# Patient Record
Sex: FEMALE | Race: BLACK OR AFRICAN AMERICAN | NOT HISPANIC OR LATINO | Employment: OTHER | URBAN - METROPOLITAN AREA
[De-identification: names, ages, dates, MRNs, and addresses within clinical notes are randomized per-mention and may not be internally consistent; named-entity substitution may affect disease eponyms.]

---

## 2017-01-25 ENCOUNTER — OFFICE VISIT (OUTPATIENT)
Dept: OPHTHALMOLOGY | Facility: CLINIC | Age: 62
End: 2017-01-25
Attending: OPHTHALMOLOGY
Payer: COMMERCIAL

## 2017-01-25 DIAGNOSIS — H53.10 SUBJECTIVE VISUAL DISTURBANCE: ICD-10-CM

## 2017-01-25 PROCEDURE — 99213 OFFICE O/P EST LOW 20 MIN: CPT | Mod: 25,ZF

## 2017-01-25 PROCEDURE — 92134 CPTRZ OPH DX IMG PST SGM RTA: CPT | Mod: ZF | Performed by: OPHTHALMOLOGY

## 2017-01-25 ASSESSMENT — REFRACTION_WEARINGRX
OD_SPHERE: -4.50
OS_AXIS: 004
SPECS_TYPE: PAL
OS_SPHERE: -4.00
OS_CYLINDER: +1.00
OD_AXIS: 048
OD_CYLINDER: +1.25

## 2017-01-25 ASSESSMENT — EXTERNAL EXAM - RIGHT EYE: OD_EXAM: NORMAL

## 2017-01-25 ASSESSMENT — VISUAL ACUITY
OS_CC: 20/40
OD_PH_CC: 20/50+2
OD_CC: 20/50
METHOD: SNELLEN - LINEAR
CORRECTION_TYPE: GLASSES
OD_CC+: -2
OS_CC+: -1

## 2017-01-25 ASSESSMENT — SLIT LAMP EXAM - LIDS
COMMENTS: NORMAL
COMMENTS: NORMAL

## 2017-01-25 ASSESSMENT — EXTERNAL EXAM - LEFT EYE: OS_EXAM: NORMAL

## 2017-01-25 ASSESSMENT — TONOMETRY
IOP_METHOD: TONOPEN
OS_IOP_MMHG: 13
OD_IOP_MMHG: 13

## 2017-01-25 ASSESSMENT — CONF VISUAL FIELD
OD_NORMAL: 1
METHOD: COUNTING FINGERS
OS_NORMAL: 1

## 2017-01-25 NOTE — NURSING NOTE
Chief Complaints and History of Present Illnesses   Patient presents with     Follow Up For     1 year follow up for previously seen visual disturbance in both eyes where they could not be corrected to 20/20.     HPI    Affected eye(s):  Both   Symptoms:     (Comment: Vision is unchanged BE.)   No floaters   No flashes   No redness   No tearing   No itching         Do you have eye pain now?:  No      Comments:  1 year follow up for previously seen visual disturbance in both eyes where they could not be corrected to 20/20.    Chang MOREIRA  8:50 AM01/25/2017

## 2017-01-25 NOTE — PROGRESS NOTES
I have confirmed the patient's and reviewed Past Medical History, Past Surgical History, Social History, Family History, Problem List, Medication List and agree with Tech note.        CHIEF COMPLAINT:  Patient is a 61 year old female who presents to the Retina Service for follow up for blurry vision both eyes     HPI:    Previously seen visual disturbance in both eyes where they could not be corrected to 20/20    Here for results mfERG done today    PAST OPHTHALMIC/MEDICAL HISTORY:    Visual disturbance both eyes     Trace nuclear sclerotic cataract  Both eyes and cortical changes right eye     PAST OPHTHALMIC PROCEDURE/SURGICAL HISTORY:    None    CURRENT OPHTHALMIC MEDICATIONS:    None    RETINAL IMAGING:    OCT macula both eyes on 9/18/14 and today showed IS/OS junction abnormalities and is stable today    Fundus autofluorescence both eyes was normal on 9/18/14, repeat in one year     Fundus exam previously showed speckled pattern noted in inferior rectus reflectance image both eyes     Fundus photo showed Retinal pigment epithelium mottling    mfERG last year shows decreased response centrally in both eyes      ASSESSMENT / PLAN:        1. Visual disturbance both eyes, cannot correct vision to 20/20 both eyes    - mfERG shows decreased central visual sensitivity in both eyes     2.  Myopia both eyes    - patient stated today she was not diagnosed until age 24.  Another option is that she suffers from intercurrent bilateral amblyopia.    3.  Nuclear sclerotic cataract  Both eyes    - Not visually significant, right eye > left eye    - refract as needed    - new prescription glasses issued    - monitor yearly       Poli Addison MD PhD.  Professor & Chair

## 2017-01-25 NOTE — MR AVS SNAPSHOT
After Visit Summary   1/25/2017    Jaylyn Berger    MRN: [id number]           Patient Information     Date Of Birth          1955        Visit Information        Provider Department      1/25/2017 9:00 AM Poli Quinteros MD [facility]        Today's Diagnoses     Subjective visual disturbance           Care Instructions    Future Appointments  Date Time Provider Department Center   1/31/2018 9:00 AM Poli Quinteros MD [facility]             Follow-ups after your visit        Follow-up notes from your care team     Return in about 1 year (around 1/25/2018) for visual disturbance , Exam & OCT OU, FAF OU.      Future tests that were ordered for you today     Open Future Orders        Priority Expected Expires Ordered    OCT Retina Spectralis OU (both eyes) Routine  7/29/2018 1/25/2017    Fundus Autofluorescence Image (FAF) OU (both eyes) Routine  7/29/2018 1/25/2017            Who to contact     Please call your clinic at [phone] to:    Ask questions about your health    Make or cancel appointments    Discuss your medicines    Learn about your test results    Speak to your doctor   If you have compliments or concerns about an experience at your clinic, or if you wish to file a complaint, please contact [facility] Patient Relations at [phone] or email us at [email or web address]         Additional Information About Your Visit        MyChart Information     Escomt is an electronic gateway that provides easy, online access to your medical records. With [employer], you can request a clinic appointment, read your test results, renew a prescription or communicate with your care team.     To sign up for Escomt visit the website at [email or web address]   You will be asked to enter the access code listed below, as well as some personal information. Please follow the directions to create your username and  password.     Your access code is: [email or web address]  Expires: 2017  8:30 AM     Your access code will  in 90 days. If you need help or a new code, please contact your [facility] or call [phone] for assistance.        Care EveryWhere ID     This is your Care EveryWhere ID. This could be used by other organizations to access your [place] medical records  [id number]         Blood Pressure from Last 3 Encounters:   No data found for BP    Weight from Last 3 Encounters:   No data found for Wt              We Performed the Following     OCT Retina Spectralis OU (both eyes)        Primary Care Provider    No Pcp Confirmed       No address on file        Thank you!     Thank you for choosing EYE CLINIC  for your care. Our goal is always to provide you with excellent care. Hearing back from our patients is one way we can continue to improve our services. Please take a few minutes to complete the written survey that you may receive in the mail after your visit with us. Thank you!             Your Updated Medication List - Protect others around you: Learn how to safely use, store and throw away your medicines at www.disposemymeds.org.          This list is accurate as of: 17 10:05 AM.  Always use your most recent med list.                   Brand Name Dispense Instructions for use    ASPIRIN PO      Take 1 tablet by mouth daily       ATENOLOL-CHLORTHALIDONE PO      Take 1 tablet by mouth daily       GLIPIZIDE PO      Take 2 tablets by mouth daily       JANUVIA PO      Take by mouth daily       LOSARTAN POTASSIUM PO      Take 1 tablet by mouth daily       METFORMIN HCL PO      Take 1 tablet by mouth daily       MULTI-VITAMIN PO      Take 1 tablet by mouth daily

## 2017-01-25 NOTE — PATIENT INSTRUCTIONS
Future Appointments  Date Time Provider Department Center   1/31/2018 9:00 AM Poli Quinteros MD [facility] CL[facility]

## 2018-01-31 ENCOUNTER — OFFICE VISIT (OUTPATIENT)
Dept: OPHTHALMOLOGY | Facility: CLINIC | Age: 63
End: 2018-01-31
Attending: OPHTHALMOLOGY
Payer: COMMERCIAL

## 2018-01-31 DIAGNOSIS — H53.10 SUBJECTIVE VISUAL DISTURBANCE: ICD-10-CM

## 2018-01-31 PROCEDURE — 92250 FUNDUS PHOTOGRAPHY W/I&R: CPT | Mod: 59,ZF | Performed by: OPHTHALMOLOGY

## 2018-01-31 PROCEDURE — 92134 CPTRZ OPH DX IMG PST SGM RTA: CPT | Mod: ZF | Performed by: OPHTHALMOLOGY

## 2018-01-31 PROCEDURE — G0463 HOSPITAL OUTPT CLINIC VISIT: HCPCS | Mod: ZF

## 2018-01-31 ASSESSMENT — VISUAL ACUITY
CORRECTION_TYPE: GLASSES
OS_CC: 20/40
METHOD: SNELLEN - LINEAR
OD_CC+: +2
OD_CC: 20/60
OD_PH_CC: 20/50

## 2018-01-31 ASSESSMENT — REFRACTION_WEARINGRX
OS_CYLINDER: +1.00
OS_AXIS: 004
OD_AXIS: 048
OD_SPHERE: -4.50
OS_SPHERE: -4.00
OD_CYLINDER: +1.25
SPECS_TYPE: PAL

## 2018-01-31 ASSESSMENT — CONF VISUAL FIELD
OS_NORMAL: 1
OD_NORMAL: 1

## 2018-01-31 ASSESSMENT — EXTERNAL EXAM - LEFT EYE: OS_EXAM: NORMAL

## 2018-01-31 ASSESSMENT — SLIT LAMP EXAM - LIDS
COMMENTS: NORMAL
COMMENTS: NORMAL

## 2018-01-31 ASSESSMENT — EXTERNAL EXAM - RIGHT EYE: OD_EXAM: NORMAL

## 2018-01-31 ASSESSMENT — TONOMETRY
OS_IOP_MMHG: 17
OD_IOP_MMHG: 17
IOP_METHOD: TONOPEN

## 2018-01-31 NOTE — NURSING NOTE
Chief Complaints and History of Present Illnesses   Patient presents with     Follow Up For      visual disturbance , Exam & OCT OU, FAF OU     HPI    Affected eye(s):  Both   Symptoms:     No floaters   No flashes   No glare   No halos      Duration:  1 year      Do you have eye pain now?:  No      Comments:   Follow up forvisual disturbance , Exam & OCT OU, FAF OU  No change in vision since last visit  Blood sugar don't check daily, A1C 7.in Mohsen Enriquez COA 8:55 AM January 31, 2018

## 2018-01-31 NOTE — MR AVS SNAPSHOT
After Visit Summary   2018    Jaylyn Berger    MRN: [id number]           Patient Information     Date Of Birth          1955        Visit Information        Provider Department      2018 9:00 AM Poli Quinteros MD [facility]        Today's Diagnoses     Subjective visual disturbance           Follow-ups after your visit        Follow-up notes from your care team     Return in about 6 months (around 2018) for  OU  Exam only.      Your next 10 appointments already scheduled     Aug 01, 2018  9:00 AM CDT   RETURN RETINA with Poli Quinteros MD   Eye Clinic ([facility])    [address]  [address]  [address]  [facility] [address] [phone]              Who to contact     Please call your clinic at [phone] to:    Ask questions about your health    Make or cancel appointments    Discuss your medicines    Learn about your test results    Speak to your doctor   If you have compliments or concerns about an experience at your clinic, or if you wish to file a complaint, please contact [facility] Patient Relations at [phone] or email us at [email or web address]         Additional Information About Your Visit        MyChart Information     AFreezet is an electronic gateway that provides easy, online access to your medical records. With [employer], you can request a clinic appointment, read your test results, renew a prescription or communicate with your care team.     To sign up for AFreezet visit the website at [email or web address]   You will be asked to enter the access code listed below, as well as some personal information. Please follow the directions to create your username and password.     Your access code is: [email or web address]  Expires: 2018  6:30 AM     Your access code will  in 90 days. If you need help or a new code, please contact your [facility]  [place] [facility] or call [phone] for assistance.        Care EveryWhere ID     This is your Care EveryWhere ID. This could be used by other organizations to access your [place] medical records  [id number]         Blood Pressure from Last 3 Encounters:   No data found for BP    Weight from Last 3 Encounters:   No data found for Wt              We Performed the Following     Fundus Autofluorescence Image (FAF) OU (both eyes)     OCT Retina Spectralis OU (both eyes)        Primary Care Provider Office Phone # Fax #    Bethany Mckinnon [phone]       [facility] [address] [place] [address]        Equal Access to Services     [facility]: Hadii aad ku hadasho Soomaali, waaxda luqadaha, qaybta kaalmada adeegyada, waxay idiin hayaan adeelpidio matthews laalyssa . So [facility] [place] [phone].    ATENCIÓN: Si habla español, tiene a muniz disposición servicios gratuitos de asistencia lingüística. [facility] [phone].    We comply with applicable federal civil rights laws and [place] laws. We do not discriminate on the basis of race, color, national origin, age, disability, sex, sexual orientation, or gender identity.            Thank you!     Thank you for choosing EYE CLINIC  for your care. Our goal is always to provide you with excellent care. Hearing back from our patients is one way we can continue to improve our services. Please take a few minutes to complete the written survey that you may receive in the mail after your visit with us. Thank you!             Your Updated Medication List - Protect others around you: Learn how to safely use, store and throw away your medicines at www.disposemymeds.org.          This list is accurate as of 1/31/18 10:47 AM.  Always use your most recent med list.                   Brand Name Dispense Instructions for use Diagnosis    ASPIRIN PO      Take 1 tablet by mouth daily        ATENOLOL-CHLORTHALIDONE PO      Take 1 tablet by  mouth daily        GLIPIZIDE PO      Take 2 tablets by mouth daily        JANUVIA PO      Take by mouth daily        LOSARTAN POTASSIUM PO      Take 1 tablet by mouth daily        METFORMIN HCL PO      Take 1 tablet by mouth daily        MULTI-VITAMIN PO      Take 1 tablet by mouth daily

## 2018-01-31 NOTE — PROGRESS NOTES
I have confirmed the patient's and reviewed Past Medical History, Past Surgical History, Social History, Family History, Problem List, Medication List and agree with Tech note.        CHIEF COMPLAINT:  Patient is a 62 year old female who presents to the Retina Service for follow up for blurry vision both eyes     HPI:    Previously seen visual disturbance in both eyes where they could not be corrected to 20/20    Here for results mfERG done today    PAST OPHTHALMIC/MEDICAL HISTORY:    Visual disturbance both eyes     Trace nuclear sclerotic cataract  Both eyes and cortical changes right eye     PAST OPHTHALMIC PROCEDURE/SURGICAL HISTORY:    None    CURRENT OPHTHALMIC MEDICATIONS:    None    RETINAL IMAGING:    OCT macula both eyes on 9/18/14 and today showed IS/OS junction abnormalities and is stable today    Fundus autofluorescence both eyes was normal on 9/18/14, and again today    Fundus exam previously showed speckled pattern noted in inferior rectus reflectance image both eyes     Fundus photo showed Retinal pigment epithelium mottling    mfERG last year shows decreased response centrally in both eyes      ASSESSMENT / PLAN:        1. Visual disturbance both eyes, cannot correct vision to 20/20 both eyes    - mfERG shows decreased central visual sensitivity in both eyes and visual acuity stable except right eye which I contribute to nuclear sclerotic cataract .     2.  Myopia both eyes    - patient stated today she was not diagnosed until age 24.  Another option is that she suffers from intercurrent bilateral amblyopia.    3.  Nuclear sclerotic cataract  Both eyes    - Early visually significant, right eye, stable  left eye.  Suggested to patient to consider Cataract extraction right eye, she will think about it.   - refract as needed    - new prescription glasses issued    - monitor yearly     Return to clinic 9 months     Poli Addison MD PhD.  Professor & Chair

## 2018-07-10 ENCOUNTER — TELEPHONE (OUTPATIENT)
Dept: OPHTHALMOLOGY | Facility: CLINIC | Age: 63
End: 2018-07-10

## 2018-07-10 NOTE — TELEPHONE ENCOUNTER
[facility] Call Center    Phone Message    May a detailed message be left on voicemail: yes    Reason for Call: Other: Pt would like to know what her appt w/ Dr. Addison on 8/2/18 will be for. Pt stated cataract surgery had been discussed at one point, and she is unsure if she was supposed to seek out a provider for this.      Action Taken: Message routed to:  [facility]

## 2018-08-02 ENCOUNTER — OFFICE VISIT (OUTPATIENT)
Dept: OPHTHALMOLOGY | Facility: CLINIC | Age: 63
End: 2018-08-02
Attending: OPHTHALMOLOGY
Payer: COMMERCIAL

## 2018-08-02 DIAGNOSIS — H25.13 SENILE NUCLEAR SCLEROSIS, BILATERAL: Primary | ICD-10-CM

## 2018-08-02 DIAGNOSIS — H53.10 SUBJECTIVE VISUAL DISTURBANCE: ICD-10-CM

## 2018-08-02 PROCEDURE — G0463 HOSPITAL OUTPT CLINIC VISIT: HCPCS | Mod: ZF

## 2018-08-02 RX ORDER — LINAGLIPTIN 5 MG/1
1 TABLET, FILM COATED ORAL ONCE
Refills: 2 | COMMUNITY
Start: 2018-07-09 | End: 2024-04-18

## 2018-08-02 ASSESSMENT — EXTERNAL EXAM - RIGHT EYE: OD_EXAM: NORMAL

## 2018-08-02 ASSESSMENT — REFRACTION_WEARINGRX
OS_SPHERE: -4.00
OD_AXIS: 048
OS_CYLINDER: +1.00
SPECS_TYPE: PAL
OD_SPHERE: -4.50
OS_AXIS: 004
OD_CYLINDER: +1.25

## 2018-08-02 ASSESSMENT — VISUAL ACUITY
OS_PH_CC: 20/60-1
OD_CC: 20/60
OD_PH_CC: 20/50-1
CORRECTION_TYPE: GLASSES
OS_CC+: -1
METHOD: SNELLEN - LINEAR
OD_CC+: -1
OS_CC: 20/70

## 2018-08-02 ASSESSMENT — TONOMETRY
IOP_METHOD: TONOPEN
OS_IOP_MMHG: 14
OD_IOP_MMHG: 16

## 2018-08-02 ASSESSMENT — SLIT LAMP EXAM - LIDS
COMMENTS: NORMAL
COMMENTS: NORMAL

## 2018-08-02 ASSESSMENT — CONF VISUAL FIELD
OD_NORMAL: 1
OS_NORMAL: 1

## 2018-08-02 ASSESSMENT — EXTERNAL EXAM - LEFT EYE: OS_EXAM: NORMAL

## 2018-08-02 NOTE — PROGRESS NOTES
I have confirmed the patient's and reviewed Past Medical History, Past Surgical History, Social History, Family History, Problem List, Medication List and agree with Tech note.    Updated HPI:  Increasingly more difficulty seeing per patient      CHIEF COMPLAINT:  Patient is a 63 year old female who presents to the Retina Service for follow up for blurry vision both eyes     HPI:    Previously seen visual disturbance in both eyes where they could not be corrected to 20/20    Here for results mfERG done today    PAST OPHTHALMIC/MEDICAL HISTORY:    Visual disturbance both eyes     Trace nuclear sclerotic cataract  Both eyes and cortical changes right eye     PAST OPHTHALMIC PROCEDURE/SURGICAL HISTORY:    None    CURRENT OPHTHALMIC MEDICATIONS:    None    RETINAL IMAGING:    OCT macula both eyes on 9/18/14 and today showed IS/OS junction abnormalities and is stable today    Fundus autofluorescence both eyes was normal on 9/18/14, and again today    Fundus exam previously showed speckled pattern noted in inferior rectus reflectance image both eyes     Fundus photo showed Retinal pigment epithelium mottling    mfERG last year shows decreased response centrally in both eyes      ASSESSMENT / PLAN:        1. Visual disturbance both eyes, cannot correct vision to 20/20 both eyes    - mfERG shows decreased central visual sensitivity in both eyes and visual acuity stable except right eye which I contribute to nuclear sclerotic cataract .     2.  Myopia both eyes    - patient stated today she was not diagnosed until age 24.  Another option is that she suffers from intercurrent bilateral amblyopia.    3.  Nuclear sclerotic cataract  Both eyes    - Visually significant, right eye, >  left eye.  Suggested to patient to consider Cataract extraction right eye, she is intereted to proceed    - refer to Jose Moreira MD today     Return to clinic one year     Poli Addison MD PhD.  Professor & Chair

## 2018-08-02 NOTE — NURSING NOTE
Chief Complaints and History of Present Illnesses   Patient presents with     Follow Up For     6 month f/u for Visual disturbance both eyes     HPI    Affected eye(s):  Both   Symptoms:           Do you have eye pain now?:  No      Comments:  Pt notes everything is about the same. No changes.     Aliya Sauer@ [facility] 9:28 AM August 2, 2018

## 2018-08-02 NOTE — Clinical Note
Patient is Ocult Macular Dystrophy with nuclear sclerotic cataract  And cortical cataract both eyes.  She would perhaps benefit from Cataract extraction and is interested

## 2018-08-02 NOTE — MR AVS SNAPSHOT
After Visit Summary   2018    Jaylyn Berger    MRN: [id number]           Patient Information     Date Of Birth          1955        Visit Information        Provider Department      2018 9:00 AM Poli Quinteros MD [facility]        Today's Diagnoses     Senile nuclear sclerosis, bilateral    -  1    Subjective visual disturbance           Follow-ups after your visit        Follow-up notes from your care team     Return in about 1 year (around 2019) for  and after cataract ou .      Your next 10 appointments already scheduled     Aug 21, 2018  8:30 AM CDT   LEI GENERAL with Jose Moreira MD   Eye Clinic ([facility])    [address] [place] [id number] [phone]              Who to contact     Please call your clinic at [phone] to:    Ask questions about your health    Make or cancel appointments    Discuss your medicines    Learn about your test results    Speak to your doctor            Additional Information About Your Visit        MyChart Information     TeraView is an electronic gateway that provides easy, online access to your medical records. With TeraView, you can request a clinic appointment, read your test results, renew a prescription or communicate with your care team.     To sign up for TeraView visit the website at [email or web address]   You will be asked to enter the access code listed below, as well as some personal information. Please follow the directions to create your username and password.     Your access code is: [email or web address]  Expires: 10/17/2018  6:31 AM     Your access code will  in 90 days. If you need help or a new code, please contact your [facility] or call [phone] for assistance.        Care EveryWhere ID     This is your Care EveryWhere ID. This could be used by other organizations to access your [place]  medical records  [id number]         Blood Pressure from Last 3 Encounters:   No data found for BP    Weight from Last 3 Encounters:   No data found for Wt              Today, you had the following     No orders found for display       Primary Care Provider Office Phone # Fax #    Bethany Mckinnon [phone]       [facility] [address] [place] [address]        Equal Access to Services     [facility]: Hadii aad ku hadasho Soomaali, waaxda luqadaha, qaybta kaalmada adeegyada, waxay idiin hayaan adeeg kharash la'aan ah. So [facility] [phone].    ATENCIÓN: Si umesh gilbert, tiene a muniz disposición servicios gratuitos de asistencia lingüística. Llame al [phone].    We comply with applicable federal civil rights laws and [place] laws. We do not discriminate on the basis of race, color, national origin, age, disability, sex, sexual orientation, or gender identity.            Thank you!     Thank you for choosing EYE CLINIC  for your care. Our goal is always to provide you with excellent care. Hearing back from our patients is one way we can continue to improve our services. Please take a few minutes to complete the written survey that you may receive in the mail after your visit with us. Thank you!             Your Updated Medication List - Protect others around you: Learn how to safely use, store and throw away your medicines at www.disposemymeds.org.          This list is accurate as of 8/2/18 10:18 AM.  Always use your most recent med list.                   Brand Name Dispense Instructions for use Diagnosis    ASPIRIN PO      Take 1 tablet by mouth daily        ATENOLOL-CHLORTHALIDONE PO      Take 1 tablet by mouth daily        GLIPIZIDE PO      Take 2 tablets by mouth daily        LOSARTAN POTASSIUM PO      Take 1 tablet by mouth daily        METFORMIN HCL PO      Take 1 tablet by mouth daily        MULTI-VITAMIN PO      Take 1 tablet by mouth daily         TRADJENTA 5 MG Tabs tablet   Generic drug:  linagliptin      Take 1 tablet by mouth once

## 2018-08-21 ENCOUNTER — OFFICE VISIT (OUTPATIENT)
Dept: OPHTHALMOLOGY | Facility: CLINIC | Age: 63
End: 2018-08-21
Attending: OPHTHALMOLOGY
Payer: COMMERCIAL

## 2018-08-21 VITALS — HEIGHT: 65 IN | WEIGHT: 130 LBS | BODY MASS INDEX: 21.66 KG/M2

## 2018-08-21 DIAGNOSIS — H25.10 SENILE NUCLEAR SCLEROSIS: Primary | ICD-10-CM

## 2018-08-21 DIAGNOSIS — H25.13 NUCLEAR SENILE CATARACT OF BOTH EYES: ICD-10-CM

## 2018-08-21 DIAGNOSIS — R94.111 ABNORMAL ELECTRORETINOGRAM (ERG): Primary | ICD-10-CM

## 2018-08-21 PROCEDURE — G0463 HOSPITAL OUTPT CLINIC VISIT: HCPCS | Mod: ZF

## 2018-08-21 PROCEDURE — 76519 ECHO EXAM OF EYE: CPT | Mod: ZF | Performed by: OPHTHALMOLOGY

## 2018-08-21 PROCEDURE — 92015 DETERMINE REFRACTIVE STATE: CPT | Mod: ZF

## 2018-08-21 ASSESSMENT — SLIT LAMP EXAM - LIDS
COMMENTS: NORMAL
COMMENTS: NORMAL

## 2018-08-21 ASSESSMENT — REFRACTION_WEARINGRX
OS_AXIS: 176
OD_ADD: +2.25
OS_ADD: +2.25
OD_CYLINDER: +0.75
OD_SPHERE: -4.50
OS_SPHERE: -4.00
OD_AXIS: 026
OS_CYLINDER: +0.75

## 2018-08-21 ASSESSMENT — EXTERNAL EXAM - RIGHT EYE: OD_EXAM: NORMAL

## 2018-08-21 ASSESSMENT — CONF VISUAL FIELD
OS_NORMAL: 1
METHOD: COUNTING FINGERS
OD_NORMAL: 1

## 2018-08-21 ASSESSMENT — REFRACTION_MANIFEST
OS_SPHERE: -4.50
OS_AXIS: 170
OD_AXIS: 035
OD_SPHERE: -4.75
OS_CYLINDER: +1.50
OD_CYLINDER: +1.75

## 2018-08-21 ASSESSMENT — VISUAL ACUITY
OD_CC: 20/40-2
OS_CC: 20/60+2
METHOD: SNELLEN - LINEAR

## 2018-08-21 ASSESSMENT — TONOMETRY
OD_IOP_MMHG: 08
OS_IOP_MMHG: 08
IOP_METHOD: TONOPEN

## 2018-08-21 ASSESSMENT — CUP TO DISC RATIO
OS_RATIO: 0.5
OD_RATIO: 0.5

## 2018-08-21 ASSESSMENT — EXTERNAL EXAM - LEFT EYE: OS_EXAM: NORMAL

## 2018-08-21 NOTE — MR AVS SNAPSHOT
After Visit Summary   8/21/2018    Jaylyn Berger    MRN: [id number]           Patient Information     Date Of Birth          1955        Visit Information        Provider Department      8/21/2018 8:30 AM Jose Moreira MD [facility]        Today's Diagnoses     Nuclear senile cataract of both eyes           Follow-ups after your visit        Your next 10 appointments already scheduled     Oct 11, 2018  9:00 AM CDT   Post-Op with Jose Moreira MD   Eye Clinic ([facility])    [address] [address]   [phone]            Oct 30, 2018  9:00 AM CDT   Post-Op with Jose Moreira MD   Eye Clinic ([facility])    [address] [address]   [phone]            Nov 15, 2018  9:00 AM CST   Post-Op with Jose Moreira MD   Eye Clinic ([facility])    [address] [address]   [phone]            Nov 29, 2018  9:00 AM CST   Post-Op with Jose Moreira MD   Eye Clinic ([facility])    [address] [address]   [phone]              Who to contact     Please call your clinic at [phone] to:    Ask questions about your health    Make or cancel appointments    Discuss your medicines    Learn about your test results    Speak to your doctor            Additional Information About Your Visit        [employer] Information     "[employer] is an electronic gateway that provides easy, online access to your medical records. With "[employer] you can request a clinic appointment, read your test results, renew a prescription or communicate with your care team.     To sign up for "[employer] visit the website at [email or web address]   You will be asked to enter the access code listed  "below, as well as some personal information. Please follow the directions to create your username and password.     Your access code is: [email or web address]  Expires: 10/17/2018  6:31 AM     Your access code will  in 90 days. If you need help or a new code, please contact your [facility] or call [phone] for assistance.        Care EveryWhere ID     This is your Care EveryWhere ID. This could be used by other organizations to access your [place] medical records  [id number]        Your Vitals Were     Height BMI (Body Mass Index)                1.651 m (5' 5\") 21.63 kg/m2           Blood Pressure from Last 3 Encounters:   No data found for BP    Weight from Last 3 Encounters:   18 59 kg (130 lb)              We Performed the Following     IOL Biometry w/ IOL calc OU (both eye)     Roberta-Operative Worksheet        Primary Care Provider Office Phone # Fax #    Bethany Mckinnon [phone]       [facility] [address] [place] [facility] [address]        Equal Access to Services     [facility] AH: Hadii aad ku hadasho Soomaali, waaxda luqadaha, qaybta kaalmada adeelpidioyada, nicholas marroquin . So [facility] [phone].    ATENCIÓN: Si habla español, tiene a muniz disposición servicios gratuitos de asistencia lingüística. Timoteo al [phone].    We comply with applicable federal civil rights laws and [place] laws. We do not discriminate on the basis of race, color, national origin, age, disability, sex, sexual orientation, or gender identity.            Thank you!     Thank you for choosing EYE CLINIC  for your care. Our goal is always to provide you with excellent care. Hearing back from our patients is one way we can continue to improve our services. Please take a few minutes to complete the written survey that you may receive in the mail after your visit with us. Thank you!             Your Updated Medication List - Protect " others around you: Learn how to safely use, store and throw away your medicines at www.disposemymeds.org.          This list is accurate as of 8/21/18 11:08 AM.  Always use your most recent med list.                   Brand Name Dispense Instructions for use Diagnosis    ASPIRIN PO      Take 1 tablet by mouth daily        ATENOLOL-CHLORTHALIDONE PO      Take 1 tablet by mouth daily        GLIPIZIDE PO      Take 2 tablets by mouth daily        LOSARTAN POTASSIUM PO      Take 1 tablet by mouth daily        METFORMIN HCL PO      Take 1 tablet by mouth daily        MULTI-VITAMIN PO      Take 1 tablet by mouth daily        TRADJENTA 5 MG Tabs tablet   Generic drug:  linagliptin      Take 1 tablet by mouth once

## 2018-08-21 NOTE — PROGRESS NOTES
HPI  Pt presents to clinic on referral from Dr. Quinteros for cataract evaluation. 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 Per pt, over the past several years her vision has declined to the point that glasses can no longer correct her vision to the point where she can see clearly.     Can do her crossword puzzle without glasses. Uses computer glasses for work and another pair of glasses for distance.     Pt tries not to drive at night because the brightness of oncoming headlights makes it difficult. Feels it is difficult to drive at night. Denies difficulty reading in low light or haloes around street lights.     Assessment & Plan      Jaylyn Berger is a 63 year old female with the following diagnoses:   1. Abnormal electroretinogram (ERG)    2. Nuclear senile cataract of both eyes         Cataract, right eye > left eye   Visually significant both eyes   Discussed limitation in best corrected visual acuity after cataract extraction given likely cone dystrophy  Risks, benefits and alternatives to cataract extraction/IOL implantation discussed; consent obtained.  Will schedule surgery today    Special equipment/needs:    Anesthesia:Topical  Dilation:Good  Iris expansion:Not needed  Pseudoexfoliation: No pseudoexfoliation  Trypan Blue: Yes, possible right eye    Right eye first  Goal emmetropia to -0.5 (45 min R)         Huan Canas M.D., PGY-2      Attending Physician Attestation:  Complete documentation of historical and exam elements from today's encounter can be found in the full encounter summary report (not reduplicated in this progress note).  I personally obtained the chief complaint(s) and history of present illness.  I confirmed and edited as necessary the review of systems, past medical/surgical history, family history, social history, and examination findings as documented by others; and I examined the patient myself.  I personally reviewed the relevant tests, images, and reports as  documented above.  I formulated and edited as necessary the assessment and plan and discussed the findings and management plan with the patient and family. . - Jose Moreira MD

## 2018-08-21 NOTE — NURSING NOTE
Chief Complaints and History of Present Illnesses   Patient presents with     Cataract Evaluation     HPI    Affected eye(s):  Both   Symptoms:     Blurred vision   No floaters   No flashes   No glare   No halos      Duration:  1 month   Frequency:  Constant       Do you have eye pain now?:  No      Comments:  Patient states that her vision is not as good as she would like it to be, no c/o halos or glare.    HARISH Galeas 8:32 AM 08/21/2018

## 2018-10-10 ENCOUNTER — TRANSFERRED RECORDS (OUTPATIENT)
Dept: HEALTH INFORMATION MANAGEMENT | Facility: CLINIC | Age: 63
End: 2018-10-10

## 2018-10-11 ENCOUNTER — OFFICE VISIT (OUTPATIENT)
Dept: OPHTHALMOLOGY | Facility: CLINIC | Age: 63
End: 2018-10-11
Attending: OPHTHALMOLOGY
Payer: COMMERCIAL

## 2018-10-11 DIAGNOSIS — Z98.890 POSTOPERATIVE EYE STATE: ICD-10-CM

## 2018-10-11 DIAGNOSIS — Z96.1 PSEUDOPHAKIA, RIGHT EYE: Primary | ICD-10-CM

## 2018-10-11 PROCEDURE — G0463 HOSPITAL OUTPT CLINIC VISIT: HCPCS | Mod: ZF

## 2018-10-11 ASSESSMENT — VISUAL ACUITY
OD_SC: 20/40
METHOD: SNELLEN - LINEAR
OD_SC+: +1

## 2018-10-11 ASSESSMENT — REFRACTION_WEARINGRX
OS_SPHERE: -4.00
OD_SPHERE: -4.50
OD_ADD: +2.25
OS_CYLINDER: +0.75
OS_ADD: +2.25
OD_CYLINDER: +0.75
OD_AXIS: 026
OS_AXIS: 176

## 2018-10-11 ASSESSMENT — EXTERNAL EXAM - RIGHT EYE: OD_EXAM: NORMAL

## 2018-10-11 ASSESSMENT — TONOMETRY
IOP_METHOD: ICARE
OD_IOP_MMHG: 09

## 2018-10-11 ASSESSMENT — SLIT LAMP EXAM - LIDS: COMMENTS: NORMAL

## 2018-10-11 NOTE — PROGRESS NOTES
HPI: POD #1 s/p RE CEIOL. Reports mild foreign body sensation. Denies eye pain, redness, new flashes/floaters, diplopia.     Assessment/Plan:  1. Pseudophakia, right eye:   -POD #1 s/p RE CEIOL   -Doing well   -IOP wnl   -Wounds intact, no leak   -Discussed drop regimen, activity restrictions,  and return precautions    RTC as scheduled on 10/30/18    Huan Sparks MD  Ophthalmology, PGY-4      Attending Physician Attestation:  Complete documentation of historical and exam elements from today's encounter can be found in the full encounter summary report (not reduplicated in this progress note).  I personally obtained the chief complaint(s) and history of present illness.  I confirmed and edited as necessary the review of systems, past medical/surgical history, family history, social history, and examination findings as documented by others; and I examined the patient myself.  I personally reviewed the relevant tests, images, and reports as documented above.  I formulated and edited as necessary the assessment and plan and discussed the findings and management plan with the patient and family. . - Jose Moreira MD

## 2018-10-11 NOTE — MR AVS SNAPSHOT
After Visit Summary   10/11/2018    Jaylyn Berger    MRN: [id number]           Patient Information     Date Of Birth          1955        Visit Information        Provider Department      10/11/2018 12:30 PM Huan Sparks MD [facility]        Today's Diagnoses     Pseudophakia, right eye    -  1    Postoperative eye state           Follow-ups after your visit        Follow-up notes from your care team     Return for Follow Up as scheduled.      Your next 10 appointments already scheduled     Oct 30, 2018  9:00 AM CDT   Post-Op with Jose Moreira MD   Eye Clinic ([facility])    [address]  [facility] [address]   [phone]            Nov 15, 2018  9:00 AM CST   Post-Op with Jose Moreira MD   Eye Clinic ([facility])    [address]  [facility] [address]   [phone]            Nov 29, 2018  9:00 AM CST   Post-Op with Jose Moreira MD   Eye Clinic ([facility])    [address]  [facility] [address]   [phone]              Who to contact     Please call your clinic at [phone] to:    Ask questions about your health    Make or cancel appointments    Discuss your medicines    Learn about your test results    Speak to your doctor            Additional Information About Your Visit        Care EveryWhere ID     This is your Care EveryWhere ID. This could be used by other organizations to access your [place] medical records  [id number]         Blood Pressure from Last 3 Encounters:   No data found for BP    Weight from Last 3 Encounters:   08/21/18 59 kg (130 lb)              Today, you had the following     No orders found for display       Primary Care Provider Office Phone # Fax #    Bethany Mckinnon [phone]       [facility] [address] [place] [address]        Equal Access to Services     [facility] : Hadii aad ku hadrhiannonjuice Somitesh, waaxda luqadaha, qaybta sherwinmacesario stone, nicholas contreras. So [facility] [place] [phone].    ATENCIÓN: Si habla español, tiene a muniz disposición servicios gratuitos de asistencia lingüística. Llame al [phone].    We comply with applicable federal civil rights laws and [place] laws. We do not discriminate on the basis of race, color, national origin, age, disability, sex, sexual orientation, or gender identity.            Thank you!     Thank you for choosing EYE CLINIC  for your care. Our goal is always to provide you with excellent care. Hearing back from our patients is one way we can continue to improve our services. Please take a few minutes to complete the written survey that you may receive in the mail after your visit with us. Thank you!             Your Updated Medication List - Protect others around you: Learn how to safely use, store and throw away your medicines at www.disposemymeds.org.          This list is accurate as of 10/11/18 11:59 PM.  Always use your most recent med list.                   Brand Name Dispense Instructions for use Diagnosis    ASPIRIN PO      Take 1 tablet by mouth daily        ATENOLOL-CHLORTHALIDONE PO      Take 1 tablet by mouth daily        GLIPIZIDE PO      Take 2 tablets by mouth daily        LOSARTAN POTASSIUM PO      Take 1 tablet by mouth daily        METFORMIN HCL PO      Take 1 tablet by mouth daily        MULTI-VITAMIN PO      Take 1 tablet by mouth daily        TRADJENTA 5 MG Tabs tablet   Generic drug:  linagliptin      Take 1 tablet by mouth once

## 2018-10-11 NOTE — NURSING NOTE
Chief Complaints and History of Present Illnesses   Patient presents with     Post Op (Ophthalmology) Right Eye     1 day s/p CE/IOL RE     HPI    Affected eye(s):  Right   Symptoms:        Duration:  1 day   Frequency:  Constant       Do you have eye pain now?:  No      Comments:  Pt. States that yesterday went well.  No c/o comfort BE.  Was able to sleep well last night.  Slight FB sensation RE today.  Audra Mcdaniel COT 12:22 PM October 11, 2018

## 2018-10-30 ENCOUNTER — OFFICE VISIT (OUTPATIENT)
Dept: OPHTHALMOLOGY | Facility: CLINIC | Age: 63
End: 2018-10-30
Attending: OPHTHALMOLOGY
Payer: COMMERCIAL

## 2018-10-30 DIAGNOSIS — Z98.890 POSTOPERATIVE EYE STATE: Primary | ICD-10-CM

## 2018-10-30 PROCEDURE — 92134 CPTRZ OPH DX IMG PST SGM RTA: CPT | Mod: ZF | Performed by: OPHTHALMOLOGY

## 2018-10-30 PROCEDURE — G0463 HOSPITAL OUTPT CLINIC VISIT: HCPCS | Mod: ZF

## 2018-10-30 PROCEDURE — 92015 DETERMINE REFRACTIVE STATE: CPT | Mod: 52,ZF

## 2018-10-30 RX ORDER — PREDNISOLONE ACETATE 10 MG/ML
SUSPENSION/ DROPS OPHTHALMIC
COMMUNITY
Start: 2018-10-10 | End: 2024-04-18

## 2018-10-30 RX ORDER — KETOROLAC TROMETHAMINE 5 MG/ML
SOLUTION OPHTHALMIC
COMMUNITY
Start: 2018-10-10 | End: 2024-04-18

## 2018-10-30 ASSESSMENT — VISUAL ACUITY
CORRECTION_TYPE: GLASSES
OD_SC: 20/50
OS_CC: 20/60
METHOD: SNELLEN - LINEAR
OD_SC+: +1

## 2018-10-30 ASSESSMENT — TONOMETRY
OS_IOP_MMHG: 15
OD_IOP_MMHG: 17
IOP_METHOD: TONOPEN

## 2018-10-30 ASSESSMENT — CONF VISUAL FIELD
METHOD: COUNTING FINGERS
OS_NORMAL: 1
OD_NORMAL: 1

## 2018-10-30 ASSESSMENT — EXTERNAL EXAM - RIGHT EYE: OD_EXAM: NORMAL

## 2018-10-30 ASSESSMENT — SLIT LAMP EXAM - LIDS: COMMENTS: NORMAL

## 2018-10-30 ASSESSMENT — REFRACTION_MANIFEST
OD_AXIS: 005
OD_SPHERE: -1.00
OD_CYLINDER: +1.00

## 2018-10-30 ASSESSMENT — CUP TO DISC RATIO: OD_RATIO: 0.45

## 2018-10-30 NOTE — MR AVS SNAPSHOT
After Visit Summary   10/30/2018    Jaylyn Berger    MRN: [id number]           Patient Information     Date Of Birth          1955        Visit Information        Provider Department      10/30/2018 9:00 AM Jose Moreira MD [facility]        Today's Diagnoses     Postoperative eye state    -  1       Follow-ups after your visit        Your next 10 appointments already scheduled     Nov 15, 2018  9:00 AM CST   Post-Op with Jose Moreira MD   Eye Clinic ([facility])    [address] [place] [address]   [phone]            Nov 29, 2018  9:00 AM CST   Post-Op with Jose Moreira MD   Eye Clinic ([facility])    [address] [address]   [phone]              Who to contact     Please call your clinic at [phone] to:    Ask questions about your health    Make or cancel appointments    Discuss your medicines    Learn about your test results    Speak to your doctor            Additional Information About Your Visit        Care EveryWhere ID     This is your Care EveryWhere ID. This could be used by other organizations to access your [place] medical records  [id number]         Blood Pressure from Last 3 Encounters:   No data found for BP    Weight from Last 3 Encounters:   08/21/18 59 kg (130 lb)              We Performed the Following     OCT Retina Spectralis OU (both eyes)        Primary Care Provider Office Phone # Fax #    Bethany Mckinnon [phone]       [facility] [address] [place] [facility] [address]        Equal Access to Services     [facility]: Hadii aad corinne hadasho Soomaali, waaxda luqadaha, qaybta kaalmada adeegyacesario, nicholas idiin hayaan adeeg kharash la'aan . [facility] [phone].    ATENCIÓN: Si habla español, tiene a muniz disposición servicios gratuitos de asistencia  lingüística. Timoteo al [phone].    We comply with applicable federal civil rights laws and [place] laws. We do not discriminate on the basis of race, color, national origin, age, disability, sex, sexual orientation, or gender identity.            Thank you!     Thank you for choosing EYE CLINIC  for your care. Our goal is always to provide you with excellent care. Hearing back from our patients is one way we can continue to improve our services. Please take a few minutes to complete the written survey that you may receive in the mail after your visit with us. Thank you!             Your Updated Medication List - Protect others around you: Learn how to safely use, store and throw away your medicines at www.disposemymeds.org.          This list is accurate as of 10/30/18 12:57 PM.  Always use your most recent med list.                   Brand Name Dispense Instructions for use Diagnosis    ASPIRIN PO      Take 1 tablet by mouth daily        ATENOLOL-CHLORTHALIDONE PO      Take 1 tablet by mouth daily        GLIPIZIDE PO      Take 2 tablets by mouth daily        ketorolac 0.5 % ophthalmic solution    ACULAR          LOSARTAN POTASSIUM PO      Take 1 tablet by mouth daily        METFORMIN HCL PO      Take 1 tablet by mouth daily        MULTI-VITAMIN PO      Take 1 tablet by mouth daily        prednisoLONE acetate 1 % ophthalmic susp    PRED FORTE          TRADJENTA 5 MG Tabs tablet   Generic drug:  linagliptin      Take 1 tablet by mouth once

## 2018-10-30 NOTE — NURSING NOTE
Chief Complaints and History of Present Illnesses   Patient presents with     Follow Up For     3 week post op CE/IOL RE     HPI    Affected eye(s):  Right   Symptoms:     No floaters   No flashes   No redness   No tearing   No Dryness         Do you have eye pain now?:  No      Comments:  Pt here for a 3 week post op CE/IOL RE. Pt states at night vision in RE still no clear. Overall during the day RE vision is good.     Aliya Sauer, [facility] 8:48 AM October 30, 2018

## 2018-10-30 NOTE — PROGRESS NOTES
HPI: POD #20 s/p RE CEIOL. Reports mild foreign body sensation. Denies eye pain, redness, new flashes/floaters, diplopia.     Assessment/Plan:  1. Pseudophakia, right eye:     -POD #20 s/p RE CEIOL   -IOP wnl   -blunted FLR; no fluid on OCT    -Best corrected visual acuity in the setting of cone-ash dystrophy.  + nyctalopia   -Hold on glasses prescription until after left eye.     -Plan for low vision consult to follow PostOp     Chris Garrison MD  Ophthalmology Resident, PGY2    Attending Physician Attestation:  Complete documentation of historical and exam elements from today's encounter can be found in the full encounter summary report (not reduplicated in this progress note).  I personally obtained the chief complaint(s) and history of present illness.  I confirmed and edited as necessary the review of systems, past medical/surgical history, family history, social history, and examination findings as documented by others; and I examined the patient myself.  I personally reviewed the relevant tests, images, and reports as documented above.  I formulated and edited as necessary the assessment and plan and discussed the findings and management plan with the patient and family. . - Jose Moreiar MD

## 2018-11-14 ENCOUNTER — TRANSFERRED RECORDS (OUTPATIENT)
Dept: HEALTH INFORMATION MANAGEMENT | Facility: CLINIC | Age: 63
End: 2018-11-14

## 2018-11-15 ENCOUNTER — OFFICE VISIT (OUTPATIENT)
Dept: OPHTHALMOLOGY | Facility: CLINIC | Age: 63
End: 2018-11-15
Attending: OPHTHALMOLOGY
Payer: COMMERCIAL

## 2018-11-15 DIAGNOSIS — Z98.890 POSTOPERATIVE EYE STATE: Primary | ICD-10-CM

## 2018-11-15 DIAGNOSIS — Z96.1 PSEUDOPHAKIA: ICD-10-CM

## 2018-11-15 PROCEDURE — G0463 HOSPITAL OUTPT CLINIC VISIT: HCPCS | Mod: ZF

## 2018-11-15 ASSESSMENT — TONOMETRY
OS_IOP_MMHG: 13
OD_IOP_MMHG: 09
IOP_METHOD: TONOPEN

## 2018-11-15 ASSESSMENT — SLIT LAMP EXAM - LIDS
COMMENTS: NORMAL
COMMENTS: NORMAL

## 2018-11-15 ASSESSMENT — CONF VISUAL FIELD
OS_NORMAL: 1
METHOD: COUNTING FINGERS
OD_NORMAL: 1

## 2018-11-15 ASSESSMENT — VISUAL ACUITY
OS_SC: 20/200
METHOD: SNELLEN - LINEAR
OS_PH_SC: 20/100
OD_SC+: -2+1
OD_SC: 20/50

## 2018-11-15 ASSESSMENT — EXTERNAL EXAM - LEFT EYE: OS_EXAM: NORMAL

## 2018-11-15 ASSESSMENT — EXTERNAL EXAM - RIGHT EYE: OD_EXAM: NORMAL

## 2018-11-15 NOTE — MR AVS SNAPSHOT
After Visit Summary   11/15/2018    Jaylyn Berger    MRN: [id number]           Patient Information     Date Of Birth          1955        Visit Information        Provider Department      11/15/2018 9:00 AM Jose Moreira MD [facility]        Today's Diagnoses     Postoperative eye state    -  1    Pseudophakia           Follow-ups after your visit        Your next 10 appointments already scheduled     Nov 29, 2018  9:00 AM CST   Post-Op with Jose Moreira MD   Eye Clinic ([facility])    [address]  [facility] Clin [address] [place] [address]   [phone]              Who to contact     Please call your clinic at [phone] to:    Ask questions about your health    Make or cancel appointments    Discuss your medicines    Learn about your test results    Speak to your doctor            Additional Information About Your Visit        Care EveryWhere ID     This is your Care EveryWhere ID. This could be used by other organizations to access your [place] medical records  [id number]         Blood Pressure from Last 3 Encounters:   No data found for BP    Weight from Last 3 Encounters:   08/21/18 59 kg (130 lb)              Today, you had the following     No orders found for display       Primary Care Provider Office Phone # Fax #    Bethany Mckinnon [phone]       [facility] [address] [place] [facility] [address]        Equal Access to Services     [facility]: Hadii aad ku hadasho Soomaali, waaxda luqadaha, qaybta kaalmada adeegyada, waxay juniorin haymarnie contreras. So [facility] [phone].    ATENCIÓN: Si habla español, tiene a muniz disposición servicios gratuitos de asistencia lingüística. Llame al [phone].    We comply with applicable federal civil rights laws and [place] laws. We do not discriminate on the basis of race, color, national origin, age, disability, sex, sexual  orientation, or gender identity.            Thank you!     Thank you for choosing EYE CLINIC  for your care. Our goal is always to provide you with excellent care. Hearing back from our patients is one way we can continue to improve our services. Please take a few minutes to complete the written survey that you may receive in the mail after your visit with us. Thank you!             Your Updated Medication List - Protect others around you: Learn how to safely use, store and throw away your medicines at www.disposemymeds.org.          This list is accurate as of 11/15/18 10:08 AM.  Always use your most recent med list.                   Brand Name Dispense Instructions for use Diagnosis    ASPIRIN PO      Take 1 tablet by mouth daily        ATENOLOL-CHLORTHALIDONE PO      Take 1 tablet by mouth daily        GLIPIZIDE PO      Take 2 tablets by mouth daily        ketorolac 0.5 % ophthalmic solution    ACULAR          LOSARTAN POTASSIUM PO      Take 1 tablet by mouth daily        METFORMIN HCL PO      Take 1 tablet by mouth daily        MULTI-VITAMIN PO      Take 1 tablet by mouth daily        prednisoLONE acetate 1 % ophthalmic susp    PRED FORTE          TRADJENTA 5 MG Tabs tablet   Generic drug:  linagliptin      Take 1 tablet by mouth once

## 2018-11-15 NOTE — PROGRESS NOTES
Assessment & Plan      Jaylyn Berger is a 63 year old female with the following diagnoses:   1. Postoperative eye state    2. Pseudophakia         left eye, day 1    Doing well  K edema  Keep patch in place at night for 5 days  Start post-operative drops and taper according to instructions  Post-operative do's and don'ts reviewed, questions answered    Recheck 2-3 weeks with refraction               Attending Physician Attestation:  I have seen and examined this patient.  I have confirmed and edited as necessary the chief complaint(s), history of present illness, review of systems, relevant history, and examination findings as documented by others.  I have personally reviewed the relevant tests, images, and reports as documented above.  I have confirmed and edited as necessary the assessment and plan and agree with this note.  - Jose Moreira MD 10:08 AM 11/15/2018

## 2018-11-15 NOTE — NURSING NOTE
Chief Complaints and History of Present Illnesses   Patient presents with     Post Op (Ophthalmology) Left Eye     ceiol     HPI    Affected eye(s):  Both   Symptoms:        Frequency:  Constant       Do you have eye pain now?:  No      Comments:  va is ok  Slept very well  Has started the gtts  No h/a  Constance Szymanski COT 9:18 AM November 15, 2018

## 2018-11-29 ENCOUNTER — OFFICE VISIT (OUTPATIENT)
Dept: OPHTHALMOLOGY | Facility: CLINIC | Age: 63
End: 2018-11-29
Attending: OPHTHALMOLOGY
Payer: COMMERCIAL

## 2018-11-29 DIAGNOSIS — R94.111 ABNORMAL ELECTRORETINOGRAM (ERG): ICD-10-CM

## 2018-11-29 DIAGNOSIS — Z96.1 PSEUDOPHAKIA: Primary | ICD-10-CM

## 2018-11-29 PROCEDURE — 92015 DETERMINE REFRACTIVE STATE: CPT | Mod: ZF

## 2018-11-29 PROCEDURE — G0463 HOSPITAL OUTPT CLINIC VISIT: HCPCS | Mod: ZF

## 2018-11-29 ASSESSMENT — REFRACTION_WEARINGRX
SPECS_TYPE: OTC READERS
OS_SPHERE: +1.75
OD_CYLINDER: SPHERE
OS_CYLINDER: SPHERE
OD_SPHERE: +1.75

## 2018-11-29 ASSESSMENT — SLIT LAMP EXAM - LIDS
COMMENTS: NORMAL
COMMENTS: NORMAL

## 2018-11-29 ASSESSMENT — VISUAL ACUITY
OS_SC+: -2
OS_SC: 20/40
OS_CC: J3
OD_CC: J3
OD_SC: 20/40
METHOD: SNELLEN - LINEAR

## 2018-11-29 ASSESSMENT — EXTERNAL EXAM - RIGHT EYE: OD_EXAM: NORMAL

## 2018-11-29 ASSESSMENT — TONOMETRY
OD_IOP_MMHG: 9
IOP_METHOD: ICARE
OS_IOP_MMHG: 10

## 2018-11-29 ASSESSMENT — CUP TO DISC RATIO: OS_RATIO: 0.5

## 2018-11-29 ASSESSMENT — EXTERNAL EXAM - LEFT EYE: OS_EXAM: NORMAL

## 2018-11-29 NOTE — PROGRESS NOTES
Assessment & Plan      Jaylyn Berger is a 63 year old female with the following diagnoses:   1. Pseudophakia - Both Eyes    2. Abnormal electroretinogram (ERG)       Doing well  Ok to resume normal activities  Taper Predforte as directed  Glasses prescription updated  Could consider low vision eval, wishes to try new glasses first  Artificial tears as needed     Patient disposition:   Return in about 9 months (around 8/29/2019) for to [facility].             Attending Physician Attestation:  I have seen and examined this patient.  I have confirmed and edited as necessary the chief complaint(s), history of present illness, review of systems, relevant history, and examination findings as documented by others.  I have personally reviewed the relevant tests, images, and reports as documented above.  I have confirmed and edited as necessary the assessment and plan and agree with this note.  - Jose Moreira MD 10:08 AM 11/15/2018

## 2018-11-29 NOTE — MR AVS SNAPSHOT
After Visit Summary   11/29/2018    Jaylyn Berger    MRN: [id number]           Patient Information     Date Of Birth          1955        Visit Information        Provider Department      11/29/2018 9:00 AM Jose [facility]ira [facility]        Today's Diagnoses     Pseudophakia - Both Eyes    -  1    Abnormal electroretinogram (ERG)           Follow-ups after your visit        Follow-up notes from your care team     Return in about 9 months (around 8/29/2019) for to ISABELA Asher.      Who to contact     Please call your clinic at [phone] to:    Ask questions about your health    Make or cancel appointments    Discuss your medicines    Learn about your test results    Speak to your doctor            Additional Information About Your Visit        Care EveryWhere ID     This is your Care EveryWhere ID. This could be used by other organizations to access your [place] medical records  [id number]         Blood Pressure from Last 3 Encounters:   No data found for BP    Weight from Last 3 Encounters:   08/21/18 59 kg (130 lb)              Today, you had the following     No orders found for display       Primary Care Provider Office Phone # Fax #    Bethany Mckinnon [phone]       [facility] [address]   [facility] [address]        Equal Access to Services     [facility]: Hadii denia dumonto Sosubhaali, waaxda luqadaha, qaybta kaalmada adeegyada, nicholas contreras. So [facility] [phone].    ATENCIÓN: Si habla español, tiene a muniz disposición servicios gratuitos de asistencia lingüística. Llame al [phone].    We comply with applicable federal civil rights laws and [place] laws. We do not discriminate on the basis of race, color, national origin, age, disability, sex, sexual orientation, or gender identity.            Thank you!     Thank you for choosing EYE CLINIC  for your care. Our goal is always to  provide you with excellent care. Hearing back from our patients is one way we can continue to improve our services. Please take a few minutes to complete the written survey that you may receive in the mail after your visit with us. Thank you!             Your Updated Medication List - Protect others around you: Learn how to safely use, store and throw away your medicines at www.disposemymeds.org.          This list is accurate as of 11/29/18 10:18 AM.  Always use your most recent med list.                   Brand Name Dispense Instructions for use Diagnosis    ASPIRIN PO      Take 1 tablet by mouth daily        ATENOLOL-CHLORTHALIDONE PO      Take 1 tablet by mouth daily        GLIPIZIDE PO      Take 2 tablets by mouth daily        ketorolac 0.5 % ophthalmic solution    ACULAR          LOSARTAN POTASSIUM PO      Take 1 tablet by mouth daily        METFORMIN HCL PO      Take 1 tablet by mouth daily        MULTI-VITAMIN PO      Take 1 tablet by mouth daily        prednisoLONE acetate 1 % ophthalmic suspension    PRED FORTE          TRADJENTA 5 MG Tabs tablet   Generic drug:  linagliptin      Take 1 tablet by mouth once

## 2018-11-29 NOTE — NURSING NOTE
"Chief Complaints and History of Present Illnesses   Patient presents with     Post Op (Ophthalmology) Left Eye     POW#3 s/p CE/IOL     HPI    Affected eye(s):  Both   Symptoms:     Blurred vision   No decreased vision   No distorted vision   Difficulty with reading   No difficulty watching television   No floaters   No flashes   No redness      Duration:  3 weeks   Frequency:  Intermittent       Do you have eye pain now?:  No      Comments:  Vision is stable since LV. Wears otc readers +1.75 for near but would like to obtain a gls Rx today for intermediate and reading glasses.   Per Pt, distance vision is \"good\" sc.    Ocular meds: Predforte qDay left eye, Ketorolac qDay OS  Cassie Ring COT 9:09 AM November 29, 2018                "

## 2018-12-13 ENCOUNTER — TELEPHONE (OUTPATIENT)
Dept: OPHTHALMOLOGY | Facility: CLINIC | Age: 63
End: 2018-12-13

## 2018-12-13 NOTE — TELEPHONE ENCOUNTER
[facility] Call Center    Phone Message    May a detailed message be left on voicemail: yes    Reason for Call: Other: Per pt she is wanting to speak to Dr Moreira due to her eye rx. Pt states she may need another eye rx for computer use because with her current rx the font is too small for her to see.     Action Taken: Message routed to:  [facility] Clinic

## 2018-12-13 NOTE — TELEPHONE ENCOUNTER
Left message with direct number at [phone]    Tech visit likely to recheck Rx for computer distance  Daniel Licea RN 12:29 PM 12/13/18

## 2019-01-07 ENCOUNTER — TELEPHONE (OUTPATIENT)
Dept: OPHTHALMOLOGY | Facility: CLINIC | Age: 64
End: 2019-01-07

## 2019-01-07 NOTE — TELEPHONE ENCOUNTER
Rx updated by dr. Moreira   Faxed to [facility]    Pt aware    Daniel Licea RN 11:12 AM 01/07/19

## 2019-01-07 NOTE — TELEPHONE ENCOUNTER
Pt called last Friday afternoon    Spoke to pt today Monday 1-7-19     States [facility] would not take glasses Rx with add to make computer glasses    Needs specific Rx for computer Rx    Note to dr. Moreira to amend last glasses Rx to include second Rx for computer glasses only Rx    [facility] Fax:  [phone]    Will update pt once updated Rx faxed to optical clinic   pt cell--[phone]    Daniel Licea RN 10:45 AM 01/07/19

## 2019-01-08 ENCOUNTER — TELEPHONE (OUTPATIENT)
Dept: OPHTHALMOLOGY | Facility: CLINIC | Age: 64
End: 2019-01-08

## 2019-01-08 NOTE — TELEPHONE ENCOUNTER
[facility] Call Center    Phone Message    May a detailed message be left on voicemail: yes    Reason for Call: Other: Joana from [employer] is calling about th pt's vision RX. She thinks the R eye cynlinder is not right and would like a call to discuss. Thanks.      Action Taken: Message routed to:  [facility]

## 2019-01-08 NOTE — TELEPHONE ENCOUNTER
Note to our lead glasses tech to review Rx with optical     Daniel Licea RN 2:00 PM 01/08/19      Message below per tech today 1-9-19   LM with patient.  Spoke with [facility] .  Typo on one RX   +75.00 cyl   Tech seemed unsure with MR and mentioned low vision.  I will review with her on Thursday.  docomentation in phone encounter.  I'm hoping patient can come back for low vision MR with myself or Dr. De Luna

## 2019-01-09 ENCOUNTER — TELEPHONE (OUTPATIENT)
Dept: OPHTHALMOLOGY | Facility: CLINIC | Age: 64
End: 2019-01-09

## 2019-01-09 NOTE — TELEPHONE ENCOUNTER
Pt called triage line    I spoke to pt at 1620    Reviewed recommendation to f/u with dr. Bae for repeat low vision refraction per message received from tech    Pt would like to review plan more with tech before scheduling    Note to tech for call back    Pt cell [phone]  Home [phone]    Daniel Licea RN 4:21 PM 01/09/19

## 2019-01-09 NOTE — TELEPHONE ENCOUNTER
I spoke to Maria A at [facility].  There is a typo on one of the refraction RX's.  I will talk to the technician and see if she feels secure with MR since she mentioned low vision.  I left message on patient's phone as well asking her to call the triage phone line

## 2019-01-21 ENCOUNTER — TELEPHONE (OUTPATIENT)
Dept: OPHTHALMOLOGY | Facility: CLINIC | Age: 64
End: 2019-01-21

## 2019-01-21 ASSESSMENT — REFRACTION_MANIFEST
OD_AXIS: 007
OS_CYLINDER: +0.50
OD_CYLINDER: +0.75
OD_CYLINDER: +0.75
OS_SPHERE: +1.50
OS_AXIS: 113
OS_AXIS: 113
OS_SPHERE: -0.50
OD_SPHERE: +1.00
OS_SPHERE: +2.00
OS_CYLINDER: +0.50
OD_SPHERE: -0.75
OD_CYLINDER: +0.75
OS_CYLINDER: +0.50
OS_ADD: +2.50
OS_AXIS: 113
OD_SPHERE: +1.75
OD_AXIS: 007
OD_AXIS: 007
OD_ADD: +2.50

## 2019-01-21 NOTE — TELEPHONE ENCOUNTER
I spoke to patient about her needs for eye glasses.  She presently has a +2.50 OTC readers which are too strong for computer.  I explained her options of a bifocal or computer glass.  She chose a computer glass. I will fax to [facility] in [facility].

## 2019-07-15 ENCOUNTER — TRANSFERRED RECORDS (OUTPATIENT)
Dept: HEALTH INFORMATION MANAGEMENT | Facility: CLINIC | Age: 64
End: 2019-07-15

## 2020-08-13 ENCOUNTER — TRANSFERRED RECORDS (OUTPATIENT)
Dept: HEALTH INFORMATION MANAGEMENT | Facility: CLINIC | Age: 65
End: 2020-08-13

## 2022-01-13 ENCOUNTER — MEDICAL CORRESPONDENCE (OUTPATIENT)
Dept: HEALTH INFORMATION MANAGEMENT | Facility: CLINIC | Age: 67
End: 2022-01-13

## 2022-09-12 ENCOUNTER — MEDICAL CORRESPONDENCE (OUTPATIENT)
Dept: HEALTH INFORMATION MANAGEMENT | Facility: CLINIC | Age: 67
End: 2022-09-12

## 2022-09-12 ENCOUNTER — TRANSFERRED RECORDS (OUTPATIENT)
Dept: HEALTH INFORMATION MANAGEMENT | Facility: CLINIC | Age: 67
End: 2022-09-12

## 2022-09-12 LAB — RETINOPATHY: NEGATIVE

## 2022-09-21 ENCOUNTER — TRANSCRIBE ORDERS (OUTPATIENT)
Dept: OTHER | Age: 67
End: 2022-09-21

## 2022-09-21 DIAGNOSIS — H35.50 UNSPECIFIED HEREDITARY RETINAL DYSTROPHY: Primary | ICD-10-CM

## 2022-10-07 ENCOUNTER — TRANSCRIBE ORDERS (OUTPATIENT)
Dept: OTHER | Age: 67
End: 2022-10-07

## 2022-10-07 DIAGNOSIS — R26.0 ATAXIC GAIT: Primary | ICD-10-CM

## 2022-11-11 ENCOUNTER — OFFICE VISIT (OUTPATIENT)
Dept: OPTOMETRY | Facility: CLINIC | Age: 67
End: 2022-11-11
Payer: COMMERCIAL

## 2022-11-11 DIAGNOSIS — H52.4 PRESBYOPIA: ICD-10-CM

## 2022-11-11 DIAGNOSIS — H35.50 HEREDITARY MACULAR DYSTROPHY: Primary | ICD-10-CM

## 2022-11-11 DIAGNOSIS — Z96.1 PSEUDOPHAKIA OF BOTH EYES: ICD-10-CM

## 2022-11-11 PROCEDURE — 99205 OFFICE O/P NEW HI 60 MIN: CPT | Performed by: OPTOMETRIST

## 2022-11-11 PROCEDURE — 92134 CPTRZ OPH DX IMG PST SGM RTA: CPT | Performed by: OPTOMETRIST

## 2022-11-11 RX ORDER — ATORVASTATIN CALCIUM 10 MG/1
1 TABLET, FILM COATED ORAL DAILY
COMMUNITY
Start: 2021-10-08

## 2022-11-11 ASSESSMENT — CONF VISUAL FIELD
OD_INFERIOR_NASAL_RESTRICTION: 0
OS_INFERIOR_TEMPORAL_RESTRICTION: 0
OS_NORMAL: 1
OD_INFERIOR_TEMPORAL_RESTRICTION: 0
OD_SUPERIOR_TEMPORAL_RESTRICTION: 0
OD_SUPERIOR_NASAL_RESTRICTION: 0
OS_SUPERIOR_NASAL_RESTRICTION: 0
METHOD: COUNTING FINGERS
OS_INFERIOR_NASAL_RESTRICTION: 0
OS_SUPERIOR_TEMPORAL_RESTRICTION: 0
OD_NORMAL: 1

## 2022-11-11 ASSESSMENT — REFRACTION_WEARINGRX
OS_CYLINDER: SPHERE
OD_SPHERE: +5.00
SPECS_TYPE: SVL
OD_CYLINDER: SPHERE
OS_SPHERE: +5.00

## 2022-11-11 ASSESSMENT — REFRACTION_MANIFEST
OS_CYLINDER: SPHERE
OD_CYLINDER: SPHERE
OS_SPHERE: -0.25
OD_SPHERE: -0.25

## 2022-11-11 ASSESSMENT — SLIT LAMP EXAM - LIDS
COMMENTS: NORMAL
COMMENTS: NORMAL

## 2022-11-11 ASSESSMENT — TONOMETRY
OD_IOP_MMHG: 10
OS_IOP_MMHG: 10
IOP_METHOD: ICARE

## 2022-11-11 ASSESSMENT — VISUAL ACUITY
OD_SC: 20/80
OS_SC: 20/80
METHOD: SNELLEN - LINEAR

## 2022-11-11 ASSESSMENT — EXTERNAL EXAM - RIGHT EYE: OD_EXAM: NORMAL

## 2022-11-11 ASSESSMENT — EXTERNAL EXAM - LEFT EYE: OS_EXAM: NORMAL

## 2022-11-11 NOTE — PROGRESS NOTES
Assessment/Plan  (H35.50) Hereditary macular dystrophy  (primary encounter diagnosis)  Comment: Noted in 2018 as cone ash dystrophy, but subsequent retina exams did not make this distinction  -Visual acuity has changed from about 20/40 in 2018 to 20/70- today. Last noted as 20/50+ in 2021 at [facility]  -mfERG on file from 1/2015  -Patient's daughter also suffers from vision loss, no other family members impacted  -OCT of macula today shows significant atrophy both eyes  Plan: Discussed findings with patient. Recommend establishing care with a retina specialist to better define her condition and provide her with better counseling regarding future progression. Patient did just meet [place] minimums regarding driving today, so DMV form was filled out and faxed in (restricted to 45mph and no freeway driving). Consider low vision OT referral as well with additional progression of condition.     (Z96.1) Pseudophakia of both eyes  Plan: Monitor.     (H52.4) Presbyopia  Plan: Discussed findings with patient. Prescription glasses did not greatly improve visual acuities today. Ok to continue with high power readers. Consider low vision OT eval for more detailed look at devices down the road.           60 minutes were spent on the date of the encounter doing chart review, history and exam, documentation, and further activities as noted above.    Complete documentation of historical and exam elements from today's encounter can  be found in the full encounter summary report (not reduplicated in this progress  note). I personally obtained the chief complaint(s) and history of present illness. I  confirmed and edited as necessary the review of systems, past medical/surgical  history, family history, social history, and examination findings as documented by  others; and I examined the patient myself. I personally reviewed the relevant tests,  images, and reports as documented above. I formulated and edited as necessary  the  assessment and plan and discussed the findings and management plan with the  patient and family.    Deo Bae, JULIO

## 2022-11-11 NOTE — NURSING NOTE
Chief Complaints and History of Present Illnesses   Patient presents with     Low Vision Evaluation       Chief Complaint(s) and History of Present Illness(es)     Low Vision Evaluation           Comments    Patient referred by Dr. Schneider at [facility] for low vision evaluation. Pt states she has had problems with her eyes for awhile. Dr. Schneider could not figure out why her vision keeps changing and recommended eval today. Has +5.00 rx readers, nothing for distance                 Antionette Foley, COA

## 2022-11-14 ENCOUNTER — TELEPHONE (OUTPATIENT)
Dept: OPHTHALMOLOGY | Facility: CLINIC | Age: 67
End: 2022-11-14

## 2022-11-14 NOTE — TELEPHONE ENCOUNTER
[facility] Call Center    Phone Message    May a detailed message be left on voicemail: yes     Reason for Call: Form or Letter   Type or form/letter needing completion: DMV   Provider: Dr Bae  Date form needed: By 1/5/2023  Once completed: Fax form to: Number on form.    Patient calling in that she saw Dr Bae on 11/11 and provider filled out her DMV form but patient noticed where it says address it's wrong. Patients address is:     Jaylyn Berger [address] [address]    Please refax with patients corrected address. Patient also states that provider placed a outside referral for Retina but patient saw Dr Quinteros on 8/2/2018 already and wants to know if patient needs to see a retina specialist again. Please call patient back at number on file. Thank you.    Action Taken: Message routed to:  [facility] Eye    Travel Screening: Not Applicable

## 2022-11-16 ENCOUNTER — TELEPHONE (OUTPATIENT)
Dept: OPHTHALMOLOGY | Facility: CLINIC | Age: 67
End: 2022-11-16

## 2022-11-16 NOTE — TELEPHONE ENCOUNTER
LVM for patient regarding Retina referral and scheduling -Per Deo Bae, right eye. Patient was last seen with Poli Quinteros MD in 2018. Provided Eye Clinic number for scheduling needs.

## 2022-11-17 NOTE — TELEPHONE ENCOUNTER
Left message for patient letting her know that Dr. Bae sent her with the physical copy of the form and unfortunately it is not scanned in our system. She may change the address on the form she has and send it or if she wants us to fax it, we would need Dr. Bae to fill out a new one.     Also advised that she does need to see retina since it has been so long and her vision has worsened.     Antionette Foley, COA, 3:36 PM 11/17/2022

## 2022-11-18 ENCOUNTER — TELEPHONE (OUTPATIENT)
Dept: OPHTHALMOLOGY | Facility: CLINIC | Age: 67
End: 2022-11-18

## 2022-11-18 NOTE — TELEPHONE ENCOUNTER
LVM for patient again regarding Retina referral and scheduling -Per Deo Bae, right eye. Patient was last seen with Poli Quinteros MD in 2018. Provided Eye Clinic number for scheduling needs.

## 2022-12-02 NOTE — TELEPHONE ENCOUNTER
RECORDS RECEIVED FROM: Internal   REASON FOR VISIT: Ataxia   Date of Appt: 03/09/2023   NOTES (FOR ALL VISITS) STATUS DETAILS   OFFICE NOTE from referring provider Care Everywhere 01/13/2022 [facility]    OFFICE NOTE from other specialist N/A    DISCHARGE SUMMARY from hospital N/A    DISCHARGE REPORT from the ER N/A    OPERATIVE REPORT N/A    MEDICATION LIST N/A    IMAGING  (FOR ALL VISITS)     EMG N/A    EEG N/A    LUMBAR PUNCTURE N/A    MIREYA SCAN N/A    ULTRASOUND (CAROTID BILAT) *VASCULAR* N/A    MRI (HEAD, NECK, SPINE) N/A    CT (HEAD, NECK, SPINE) N/A

## 2022-12-15 DIAGNOSIS — H53.10 SUBJECTIVE VISUAL DISTURBANCE: Primary | ICD-10-CM

## 2022-12-15 DIAGNOSIS — H35.50 RETINAL DYSTROPHY: ICD-10-CM

## 2023-01-05 ENCOUNTER — TELEPHONE (OUTPATIENT)
Dept: OPHTHALMOLOGY | Facility: CLINIC | Age: 68
End: 2023-01-05

## 2023-02-15 DIAGNOSIS — H35.50 RETINAL DYSTROPHY: Primary | ICD-10-CM

## 2023-03-02 ENCOUNTER — OFFICE VISIT (OUTPATIENT)
Dept: OPHTHALMOLOGY | Facility: CLINIC | Age: 68
End: 2023-03-02
Attending: OPHTHALMOLOGY
Payer: COMMERCIAL

## 2023-03-02 DIAGNOSIS — H35.52 CONE-ROD DYSTROPHY: Primary | ICD-10-CM

## 2023-03-02 DIAGNOSIS — H35.50 HEREDITARY MACULAR DYSTROPHY: ICD-10-CM

## 2023-03-02 PROCEDURE — G0463 HOSPITAL OUTPT CLINIC VISIT: HCPCS | Mod: 25

## 2023-03-02 PROCEDURE — 92134 CPTRZ OPH DX IMG PST SGM RTA: CPT | Performed by: OPHTHALMOLOGY

## 2023-03-02 PROCEDURE — 92250 FUNDUS PHOTOGRAPHY W/I&R: CPT | Performed by: OPHTHALMOLOGY

## 2023-03-02 PROCEDURE — 99207 FUNDUS AUTOFLUORESCENCE IMAGE (FAF) OU (BOTH EYES): CPT | Mod: 26 | Performed by: OPHTHALMOLOGY

## 2023-03-02 PROCEDURE — 99204 OFFICE O/P NEW MOD 45 MIN: CPT | Mod: GC | Performed by: OPHTHALMOLOGY

## 2023-03-02 ASSESSMENT — SLIT LAMP EXAM - LIDS
COMMENTS: NORMAL
COMMENTS: NORMAL

## 2023-03-02 ASSESSMENT — CONF VISUAL FIELD
OD_NORMAL: 1
OS_INFERIOR_NASAL_RESTRICTION: 0
OD_SUPERIOR_TEMPORAL_RESTRICTION: 0
OS_SUPERIOR_NASAL_RESTRICTION: 0
OD_INFERIOR_NASAL_RESTRICTION: 0
OS_INFERIOR_TEMPORAL_RESTRICTION: 0
OD_SUPERIOR_NASAL_RESTRICTION: 0
OS_NORMAL: 1
OD_INFERIOR_TEMPORAL_RESTRICTION: 0
METHOD: COUNTING FINGERS
OS_SUPERIOR_TEMPORAL_RESTRICTION: 0

## 2023-03-02 ASSESSMENT — CUP TO DISC RATIO
OS_RATIO: 0.5
OD_RATIO: 0.5

## 2023-03-02 ASSESSMENT — TONOMETRY
OS_IOP_MMHG: 12
IOP_METHOD: TONOPEN
OD_IOP_MMHG: 11

## 2023-03-02 ASSESSMENT — EXTERNAL EXAM - LEFT EYE: OS_EXAM: NORMAL

## 2023-03-02 ASSESSMENT — VISUAL ACUITY
OD_SC: 20/70
METHOD: SNELLEN - LINEAR
OS_SC: 20/80

## 2023-03-02 ASSESSMENT — EXTERNAL EXAM - RIGHT EYE: OD_EXAM: NORMAL

## 2023-03-02 NOTE — NURSING NOTE
Chief Complaints and History of Present Illnesses   Patient presents with     Retinal Evaluation     Hereditary macular dystrophy     Chief Complaint(s) and History of Present Illness(es)     Retinal Evaluation            Comments: Hereditary macular dystrophy          Comments    Pt ref by Dr Bae,pt had low vision consult and new RX 11/11/22   States no flashes, floaters eye pain or redness    Tatianna Moreira COT 9:18 AM March 2, 2023     .

## 2023-03-02 NOTE — PROGRESS NOTES
I have confirmed the patient's and reviewed Past Medical History, Past Surgical History, Social History, Family History, Problem List, Medication List and agree with Tech note.    ID:   Jaylyn Berger is a 67 year old female with past ocular history of visual disturbance OU, pseudophakia OU and past medical history of breast cancer (2010 - treated, in remission), T2DM, HTN who presents today to re-establish care. Previously was worked up with mfERG with decreased central visual acuity in both eyes felt to be due to cataracts OU - has not had repeat mfERG since CEIOL OU () because lost to follow up.    Updated HPI Mar 2, 2023 : LCV 2018 with me. Since then patient had cataract surgery OU with Dr. Moreira and then was lost to follow up since 2018. She states she started going to [facility] yearly since then until she was sent by them to Dr. Bae's low vision clinic. Patient was seen by Dr. Bae 2022 who noted that visual acuity has been decreasing since 2018 and recommended re-establishing care with retina clinic. Patient believes her vision improved after cataract surgery in 2018 but then has gradually worsened in both eyes. Feels vision has been stable over the last year.    Retinal Dystrophy assessment:  Nyctalopia: yes  Problems going from outside bright light to inside: no  Problems going from inside to bright light outside: no  Photosensitivity: no  Problems with steps, curbs, or stairs: no (does use a walker but no diagnosis - neurology visit next week)  Problems with color vision: no  Sees flashing lights: no    PSH:  CEIOL OU 2018    Imagin2018 OCT macula both eyes showed IS/OS junction abnormalities, stable   2018 FAF OU normal   2015 mfERG last shows decreased response centrally in both eyes    23 OCT macula  OD: interval central loss of outer retinal layers as well as diffuse retinal atrophy compared to 2018 - previous outer retinal abnormalities resolved into  atrophy  OS: central ERM, interval central loss of outer retinal layers as well as diffuse retinal atrophy compared to 2018 - previous outer retinal abnormalities resolved into atrophy    03/02/23 FAF   OD: parafoveal hyperAF  OS: parafoveal hyperAF    03/02/23 fundus multicolor photo   OD: hypopigmented circular area supratemporal to fovea more prominent in blue reflectance, hypopigmented arc nasal to fovea   OS: hypopigmented area inferotemporal to fovea most prominent in blue reflectance      ASSESSMENT / PLAN:        1. Cone Laurent Dystrophy both eyes   - Initially seen 2014 with mfERG consistent with occult retinal dystrophy but required further clinical correlation and testing. Followed yearly afterwards. There was also the possibility that she had bilateral amblyopia given that she stated she had not been diagnosed with amblyopia until age 24. Finally the patient was found to have cataracts in both eyes felt to possibly explain her decreased central mfERG responses so was referred for cataract surgery OU 2018 which was completed but patient has been lost to follow up since then.   - When she first presented in 2014 her vision was 20/30 OU and declined to around 20/40 OU before her cataract surgery. After the surgeries her vision stayed in a similar range at the end of 2018. 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   - Progressive trouble walking concerning for possible muscular dystrophy but EOM full.    Plan:  - Refer to Inherited retinal disease clinic for genetic evaluation both nuclear and mitochondrial DNA testing (Simon Raygoza?)and repeat ERG per that clinic.  If that is negative proceed with working up for auto-immune  retinopathy   - Neurology evaluation 3/9/2023 for trouble walking     2.  Myopia both eyes    - patient stated today she was not diagnosed until age 24.  Another option is that she suffers from intercurrent bilateral amblyopia. Amblyopia unlikely given progression of vision loss and other syndromic characteristics including difficulty walking.    3.  Pseudophakia, OU   - Stable, monitor     4. T2DM w/o DR   - Follows with PCP. Discussed importance of glucose control and BP control. Last a1c 8.5% 2/2023.   - Monitor annually    5. Dry eye OU   - PEE OU L>R; patient asymptomatic    - Start AT QID OU PRN   - AT marcela QHS OU    6. PCO OU   - YAG cap OD first next visit    RTC: next available IRD clinic,     Garrett Raphael MD  Resident Physician - PGY3  Department of Ophthalmology   [facility]    Attestation:  I have seen and examined the patient with Dr. Raphael and agree with the findings in this note, as well as the interpretations of the diagnostic tests.      Poli Addison MD PhD.  Professor & Chair

## 2023-03-02 NOTE — Clinical Note
This patient has progressive photoreceptor loss both eyes and now muscle weakness.  I'm concerned that these are related, mitochondrial inherited disease pattern.  My thoughts are in my note 3/2/2023  She is seeing a neurologist in [place] next week

## 2023-03-02 NOTE — PROGRESS NOTES
[facility]  Section of General Neurology  New Patient Visit      Jaylyn Berger MRN# [id number]   Age: 67 year old YOB: 1955     Requesting physician: Referred Self  Bethany Mckinnon     Reason for Consultation: Ataxia, balance issues, incoordination           Assessment and Plan:   Assessment:  Jaylyn Berger is a pleasant 67 year old female seen in consultation for ataxia.  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    Her maternal side of the family had issues with ataxia as does her daughter, more profoundly than her.  Her daughter also has visual issues worse that hers that does not allow her to drive.  To exam I do think her balance issues are beyond what would be expected from her neuropathy and she does have dysmetria and ataxia to exam.  She has a negative serum paraneoplastic panel in this regard as below.  Previous MRI reportedly with some degree of cerebellar atrophy.  She does not drink alcohol.  Discussed I would recommend gathering further data in blood work, MRI brain and C spine, seeing out genetics and ataxia teams as there is certainly a suspicion based on her family history that there could be a genetic cause of her neurological symptoms ? Of a SCA subtype? Or otherwise.       Plan:  --MRI brain and C spine to exclude structural causes/assess for degree of cerebellar atrophy, last MRI brain 2019  --Blood work: Vitamin B1, B12, E, Copper, RPR, HIV, Celiac ab, TSH, SSA SSB to see what could be further optimized potentially, pending this and genetic testing/further data will see what our ataxia clinic thinks about utility of CSF or other additional testing in setting of normal serum paraneoplastic testing and long standing/progressive course.  -- I will reach out with above data as it returns and let her know if we need any additional data prior to Ataxia  "clinic referral  --Genetics referral  --Ataxia clinic referral to discuss further possible testing and etiologies        Frank Osborne MD   of Neurology   [facility]      History of Presenting Symptoms:   Jaylyn Berger is a 67 year old female who presents today for evaluation of ataxia    Balance problems have been ongoing since  she suspects.  Walking would lead to knees giving out    Drinking hx--none  Falls---Fell on , knees gave out.   Speech is a tad garbled--has been told that but she perceives her speech as normal.  Can't type fast/poor coordination, has been worsening  No issues with eating or drinking  How limited--Can drive, pay bills, all ADLs.  x48 years.    Family history in this regard--men and women and mother's side all needed wheelchairs  They lived a mostly normal lifespan  Mother  at 68.   Grandfather would sway when he walked. Required a wheelchair before he .   Needs help with getting groceries to car if a lot of groceries    Her daughter can't drive at all, has similar symptoms.  Vision precludes her from driving.  She also has trouble walking/worse than Jaylyn's case      \"She saw Dr. Missael Lopez who sent for paraneoplastic panel (normal) as below and referred to the ataxia clinic at the [facility].  That consult led her to our general neurology clinic here today.     Ophthalmology  Note reviewed   Plan:  - Refer to Inherited retinal disease clinic for genetic evaluation both nuclear and mitochondrial DNA testing (Simon Jaret?)and repeat ERG per that clinic.  If that is negative proceed with working up for auto-immune retinopathy   - Neurology evaluation 3/9/2023 for trouble walking      2.  Myopia both eyes               - patient stated today she was not diagnosed until age 24.  Another option is that she suffers from intercurrent bilateral amblyopia. Amblyopia unlikely given progression of vision loss and other " "syndromic characteristics including difficulty walking.     3.  Pseudophakia, OU              - Stable, monitor      4. T2DM w/o DR              - Follows with PCP. Discussed importance of glucose control and BP control. Last a1c 8.5% 2/2023.              - Monitor annually     5. Dry eye OU              - PEE OU L>R; patient asymptomatic               - Start AT QID OU PRN              - AT marcela QHS OU     6. PCO OU              - YAG cap OD first next visit\"         Past Medical History:     Patient Active Problem List   Diagnosis     Subjective visual disturbance     Past Medical History:   Diagnosis Date     Breast cancer (H)      Diabetes (H)      Hypertension         Past Surgical History:     Past Surgical History:   Procedure Laterality Date     CATARACT IOL, RT/LT Right 10/10/2018     CATARACT IOL, RT/LT Left 11/14/2018     HYSTERECTOMY       LUMPECTOMY BREAST  2010     SINUS SURGERY       TUBAL LIGATION          Social History:     Social History     Tobacco Use     Smoking status: Never     Smokeless tobacco: Never        Family History:     Family History   Problem Relation Age of Onset     Diabetes Father      Cancer Paternal Grandmother      Diabetes Paternal Grandmother      Glaucoma No family hx of      Macular Degeneration No family hx of         Medications:     Current Outpatient Medications   Medication Sig     ASPIRIN PO Take 1 tablet by mouth daily     ATENOLOL-CHLORTHALIDONE PO Take 1 tablet by mouth daily     atorvastatin (LIPITOR) 10 MG tablet Take 1 tablet by mouth daily     cholecalciferol 25 MCG (1000 UT) TABS Take 1,000 Units by mouth daily     empagliflozin (JARDIANCE) 25 MG TABS tablet Take 25 mg by mouth daily     GLIPIZIDE PO Take 2 tablets by mouth daily     ketorolac (ACULAR) 0.5 % ophthalmic solution  (Patient not taking: Reported on 11/11/2022)     LOSARTAN POTASSIUM PO Take 1 tablet by mouth daily     METFORMIN HCL PO Take 1 tablet by mouth daily     Multiple Vitamin " "(MULTI-VITAMIN PO) Take 1 tablet by mouth daily (Patient not taking: Reported on 11/11/2022)     prednisoLONE acetate (PRED FORTE) 1 % ophthalmic susp  (Patient not taking: Reported on 11/11/2022)     TRADJENTA 5 MG TABS tablet Take 1 tablet by mouth once (Patient not taking: Reported on 11/11/2022)     No current facility-administered medications for this visit.        Allergies:     Allergies   Allergen Reactions     Benicar [Olmesartan] Unknown and Cough     Ibuprofen Unknown and Cough     Lisinopril Unknown and Cough        Review of Systems:   As noted above     Physical Exam:   Vitals: /76 (BP Location: Right arm, Patient Position: Sitting, Cuff Size: Adult Regular)   Ht 1.626 m (5' 4\")   Wt 58.1 kg (128 lb)   SpO2 96%   BMI 21.97 kg/m         Neuro:   General Appearance: No apparent distress, well-nourished, well-groomed, pleasant     Mental Status: Alert and oriented to person, place, and time. Speech fluent and comprehension intact. Mild dysarthria, some scanning speech at times      Cranial Nerves:   II: Visual fields: normal  III: Pupils: 3 mm, equal, round, reactive to light   III,IV,VI: Extraocular Movements: intact without end gaze nystagmus  V: Facial sensation: intact to light touch  VII: Facial strength: intact without asymmetry  VIII: Hearing: intact grossly  IX: Palate: intact   XII: Tongue movement: normal     Motor Exam:   5/5 Diffusely  Sensory: intact to light touch, vibration diminished at ankle and great toe b/l    Coordination: Dysmetria present to FnF L>R and overshoot present to finger radha L >R    Reflexes: biceps, triceps, brachioradialis, patellar 2+ and ankle jerks 1+ and symmetric. Toes are downgoing bilaterally    Gait: requires walker.  Struggles trial without walker.  Significant sway to romberg with eyes open and closed.         Data: Pertinent prior to visit   Imaging:  MRI 2019   INDICATION: ataxia, intermittent weakness of both legs for one year, falls. No trauma to " head.       TECHNIQUE:  MRI of the head without contrast using routine protocol.     COMPARISON: None.     FINDINGS:  Normal diffusion. Mild cerebellar volume loss. Several punctate T2 and FLAIR signal hyperintensities within the cerebral white matter. Normal flow voids within the major intracranial vessels. Intraocular lens implants. Nasal septal deviation to the right.     IMPRESSION:     1. No evidence for acute infarct.   2. Mild cerebellar volume loss.   3. Several punctate T2 and FLAIR signal hyperintensities within the cerebral white matter likely represent minimal chronic microvascular ischemic changes.      MRI C spine 2022  IMPRESSION:   1. Moderate to severe bilateral neural foraminal stenosis at the C3-4 level with potential bilateral C4 nerve root effacement intraforminally.   2. Additional degenerative changes as above.   3. No evidence for subluxation or spinal cord signal abnormality.   4. Partial visualization of mixed signal intensity left thyroid nodule measuring at least 18 x 14 x 29 mm. Recommend correlation to thyroid ultrasound for further characterization    Imaging is not in PACS that I can see but reports are reviewed.        Procedures:      Laboratory:     Movement Autoimmune Eval, S   Movement Disorder Interp, S       See Note                               No informative autoantibodies were detected in this      evaluation. However, a negative result does not exclude an      autoimmune movement disorder. Sensitivity is enhanced by      testing both serum and CSF.   Amphiphysin Ab, S                 Negative          titer   <1:240             -------------------ADDITIONAL INFORMATION-------------------     This test was developed and its performance characteristics      determined by [facility] in a manner consistent with CLIA      requirements. This test has not been cleared or approved by      the U.S. Food and Drug Administration.   AGNA-1, S                         Negative           titer   <1:240             -------------------ADDITIONAL INFORMATION-------------------     This test was developed and its performance characteristics      determined by [facility] in a manner consistent with CLIA      requirements. This test has not been cleared or approved by      the U.S. Food and Drug Administration.   MAE-1, S                         Negative          titer   <1:240     Reflex Added                      None.                                        -------------------ADDITIONAL INFORMATION-------------------     This test was developed and its performance characteristics      determined by [facility] in a manner consistent with CLIA      requirements. This test has not been cleared or approved by      the U.S. Food and Drug Administration.   MAE-2, S                         Negative          titer   <1:240             -------------------ADDITIONAL INFORMATION-------------------     This test was developed and its performance characteristics      determined by [facility] in a manner consistent with CLIA      requirements. This test has not been cleared or approved by      the U.S. Food and Drug Administration.   MAE-3, S                         Negative          titer   <1:240             -------------------ADDITIONAL INFORMATION-------------------     This test was developed and its performance characteristics      determined by [facility] in a manner consistent with CLIA      requirements. This test has not been cleared or approved by      the U.S. Food and Drug Administration.   CASPR2-IgG CBA, S                 Negative                  Negative           -------------------ADDITIONAL INFORMATION-------------------     This test was developed and its performance characteristics      determined by [facility] in a manner consistent with CLIA      requirements. This test has not been cleared or approved by      the U.S. Food and Drug Administration.   CRMP-5-IgG, S                      Negative          titer   <1:240             -------------------ADDITIONAL INFORMATION-------------------     This test was developed and its performance characteristics      determined by [facility] in a manner consistent with CLIA      requirements. This test has not been cleared or approved by      the U.S. Food and Drug Administration.   CRMP-5-IgG Western Blot, S        Negative                  Negative           -------------------ADDITIONAL INFORMATION-------------------     This test was developed and its performance characteristics      determined by [facility] in a manner consistent with CLIA      requirements. This test has not been cleared or approved by      the U.S. Food and Drug Administration.   DPPX Ab IFA, S                    Negative                  Negative           -------------------ADDITIONAL INFORMATION-------------------     This test was developed and its performance characteristics      determined by [facility] in a manner consistent with CLIA      requirements. This test has not been cleared or approved by      the U.S. Food and Drug Administration.   GAD65 Ab Assay, S                 0.00              nmol/L  <= 0.02            -------------------ADDITIONAL INFORMATION-------------------     This test was developed and its performance characteristics      determined by [facility] in a manner consistent with CLIA      requirements. This test has not been cleared or approved by      the U.S. Food and Drug Administration.   GRAF1 IFA, S                      Negative                  Negative           -------------------ADDITIONAL INFORMATION-------------------     This test was developed and its performance characteristics      determined by [facility] in a manner consistent with CLIA      requirements. This test has not been cleared or approved by      the U.S. Food and Drug Administration.   IgLON5 IFA, S                     Negative                  Negative            -------------------ADDITIONAL INFORMATION-------------------     This test was developed and its performance characteristics      determined by [facility] in a manner consistent with CLIA      requirements. This test has not been cleared or approved by      the U.S. Food and Drug Administration.   ITPR1 IFA, S                      Negative                  Negative           -------------------ADDITIONAL INFORMATION-------------------     This test was developed and its performance characteristics      determined by [facility] in a manner consistent with CLIA      requirements. This test has not been cleared or approved by      the U.S. Food and Drug Administration.   LGI1-IgG CBA, S                   Negative                  Negative           -------------------ADDITIONAL INFORMATION-------------------     This test was developed and its performance characteristics      determined by [facility] in a manner consistent with CLIA      requirements. This test has not been cleared or approved by      the U.S. Food and Drug Administration.   mGluR1 Ab IFA, S                  Negative                  Negative           -------------------ADDITIONAL INFORMATION-------------------     This test was developed and its performance characteristics      determined by [facility] in a manner consistent with CLIA      requirements. This test has not been cleared or approved by      the U.S. Food and Drug Administration.   NIF IFA, S                        Negative                  Negative           -------------------ADDITIONAL INFORMATION-------------------     This test was developed and its performance characteristics      determined by [facility] in a manner consistent with CLIA      requirements. This test has not been cleared or approved by      the U.S. Food and Drug Administration.   NMDA-R Ab CBA, S                  Negative                  Negative           -------------------ADDITIONAL  INFORMATION-------------------     This test was developed and its performance characteristics      determined by [facility] in a manner consistent with CLIA      requirements. This test has not been cleared or approved by      the U.S. Food and Drug Administration.   P/Q-Type Calcium Channel Ab       0.00              nmol/L  <=0.02             -------------------ADDITIONAL INFORMATION-------------------     This test was developed and its performance characteristics      determined by [facility] in a manner consistent with CLIA      requirements. This test has not been cleared or approved by      the U.S. Food and Drug Administration.   PCA-1, S                          Negative          titer   <1:240             -------------------ADDITIONAL INFORMATION-------------------     This test was developed and its performance characteristics      determined by [facility] in a manner consistent with CLIA      requirements. This test has not been cleared or approved by      the U.S. Food and Drug Administration.   PCA-2, S                          Negative          titer   <1:240             -------------------ADDITIONAL INFORMATION-------------------     This test was developed and its performance characteristics      determined by [facility] in a manner consistent with CLIA      requirements. This test has not been cleared or approved by      the U.S. Food and Drug Administration.   PCA-Tr, S                         Negative          titer   <1:240             -------------------ADDITIONAL INFORMATION-------------------     This test was developed and its performance characteristics      determined by [facility] in a manner consistent with CLIA      requirements. This test has not been cleared or approved by      the U.S. Food and Drug Administration.        The total time of this encounter today amounted to 68 minutes. This time included time spent with the patient, prep work, ordering tests, and performing post  visit documentation.

## 2023-03-09 ENCOUNTER — OFFICE VISIT (OUTPATIENT)
Dept: NEUROLOGY | Facility: CLINIC | Age: 68
End: 2023-03-09
Payer: COMMERCIAL

## 2023-03-09 ENCOUNTER — TELEPHONE (OUTPATIENT)
Dept: NEUROSURGERY | Facility: CLINIC | Age: 68
End: 2023-03-09

## 2023-03-09 ENCOUNTER — TELEPHONE (OUTPATIENT)
Dept: NEUROLOGY | Facility: CLINIC | Age: 68
End: 2023-03-09

## 2023-03-09 ENCOUNTER — PRE VISIT (OUTPATIENT)
Dept: NEUROLOGY | Facility: CLINIC | Age: 68
End: 2023-03-09

## 2023-03-09 VITALS
DIASTOLIC BLOOD PRESSURE: 76 MMHG | HEIGHT: 64 IN | OXYGEN SATURATION: 96 % | SYSTOLIC BLOOD PRESSURE: 114 MMHG | BODY MASS INDEX: 21.85 KG/M2 | WEIGHT: 128 LBS

## 2023-03-09 DIAGNOSIS — R26.89 POOR BALANCE: ICD-10-CM

## 2023-03-09 DIAGNOSIS — R20.8 VIBRATION SENSORY LOSS: ICD-10-CM

## 2023-03-09 DIAGNOSIS — R74.8 ABNORMAL LEVELS OF OTHER SERUM ENZYMES: ICD-10-CM

## 2023-03-09 DIAGNOSIS — R27.0 ATAXIA: Primary | ICD-10-CM

## 2023-03-09 DIAGNOSIS — R79.9 ABNORMAL FINDING OF BLOOD CHEMISTRY, UNSPECIFIED: ICD-10-CM

## 2023-03-09 DIAGNOSIS — R27.9 INCOORDINATION: ICD-10-CM

## 2023-03-09 LAB
HBA1C MFR BLD: 7.9 % (ref 0–5.6)
TSH SERPL DL<=0.005 MIU/L-ACNC: 0.9 MU/L (ref 0.4–4)
VIT B12 SERPL-MCNC: 585 PG/ML (ref 232–1245)

## 2023-03-09 PROCEDURE — 84425 ASSAY OF VITAMIN B-1: CPT | Mod: 90 | Performed by: STUDENT IN AN ORGANIZED HEALTH CARE EDUCATION/TRAINING PROGRAM

## 2023-03-09 PROCEDURE — 86235 NUCLEAR ANTIGEN ANTIBODY: CPT | Performed by: STUDENT IN AN ORGANIZED HEALTH CARE EDUCATION/TRAINING PROGRAM

## 2023-03-09 PROCEDURE — 87389 HIV-1 AG W/HIV-1&-2 AB AG IA: CPT | Performed by: STUDENT IN AN ORGANIZED HEALTH CARE EDUCATION/TRAINING PROGRAM

## 2023-03-09 PROCEDURE — 83036 HEMOGLOBIN GLYCOSYLATED A1C: CPT | Performed by: STUDENT IN AN ORGANIZED HEALTH CARE EDUCATION/TRAINING PROGRAM

## 2023-03-09 PROCEDURE — 99205 OFFICE O/P NEW HI 60 MIN: CPT | Performed by: STUDENT IN AN ORGANIZED HEALTH CARE EDUCATION/TRAINING PROGRAM

## 2023-03-09 PROCEDURE — 84443 ASSAY THYROID STIM HORMONE: CPT | Performed by: STUDENT IN AN ORGANIZED HEALTH CARE EDUCATION/TRAINING PROGRAM

## 2023-03-09 PROCEDURE — 86780 TREPONEMA PALLIDUM: CPT | Performed by: STUDENT IN AN ORGANIZED HEALTH CARE EDUCATION/TRAINING PROGRAM

## 2023-03-09 PROCEDURE — 82525 ASSAY OF COPPER: CPT | Mod: 90 | Performed by: STUDENT IN AN ORGANIZED HEALTH CARE EDUCATION/TRAINING PROGRAM

## 2023-03-09 PROCEDURE — 82607 VITAMIN B-12: CPT | Performed by: STUDENT IN AN ORGANIZED HEALTH CARE EDUCATION/TRAINING PROGRAM

## 2023-03-09 PROCEDURE — 36415 COLL VENOUS BLD VENIPUNCTURE: CPT | Performed by: STUDENT IN AN ORGANIZED HEALTH CARE EDUCATION/TRAINING PROGRAM

## 2023-03-09 PROCEDURE — 86364 TISS TRNSGLTMNASE EA IG CLAS: CPT | Performed by: STUDENT IN AN ORGANIZED HEALTH CARE EDUCATION/TRAINING PROGRAM

## 2023-03-09 PROCEDURE — 99000 SPECIMEN HANDLING OFFICE-LAB: CPT | Performed by: STUDENT IN AN ORGANIZED HEALTH CARE EDUCATION/TRAINING PROGRAM

## 2023-03-09 PROCEDURE — 84446 ASSAY OF VITAMIN E: CPT | Mod: 90 | Performed by: STUDENT IN AN ORGANIZED HEALTH CARE EDUCATION/TRAINING PROGRAM

## 2023-03-09 NOTE — TELEPHONE ENCOUNTER
Health Call Center    Phone Message    May a detailed message be left on voicemail: yes     Reason for Call: Other: Pt is calling because she had a visit with Dr. Osborne in [place] and was handed the wrong after visit summary. Writer is filling out a compass report and Pt will shred this. Pt is requesting her after visit summary. Please call Pt to discuss     Action Taken: Message routed to:  Other:  neurology    Travel Screening: Not Applicable

## 2023-03-09 NOTE — LETTER
3/9/2023         RE: Jaylyn Berger  [address] [facility] [address]        Dear Colleague,    Thank you for referring your patient, Jaylyn Berger, to the [facility] [place]. Please see a copy of my visit note below.    [facility]  Section of General Neurology  New Patient Visit      Jaylyn Berger MRN# [id number]   Age: 67 year old YOB: 1955     Requesting physician: Referred Self  Bethany Mckinnon     Reason for Consultation: Ataxia, balance issues, incoordination           Assessment and Plan:   Assessment:  Jaylyn Berger is a pleasant 67 year old female seen in consultation for ataxia.  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    Her maternal side of the family had issues with ataxia as does her daughter, more profoundly than her.  Her daughter also has visual issues worse that hers that does not allow her to drive.  To exam I do think her balance issues are beyond what would be expected from her neuropathy and she does have dysmetria and ataxia to exam.  She has a negative serum paraneoplastic panel in this regard as below.  Previous MRI reportedly with some degree of cerebellar atrophy.  She does not drink alcohol.  Discussed I would recommend gathering further data in blood work, MRI brain and C spine, seeing out genetics and ataxia teams as there is certainly a suspicion based on her family history that there could be a genetic cause of her neurological symptoms ? Of a SCA subtype? Or otherwise.       Plan:  --MRI brain and C spine to exclude structural causes/assess for degree of cerebellar atrophy, last MRI brain 2019  --Blood work: Vitamin B1, B12, E, Copper, RPR, HIV, Celiac ab, TSH, SSA SSB to see what could be further optimized potentially, pending this and genetic testing/further data will see what our  "ataxia clinic thinks about utility of CSF or other additional testing in setting of normal serum paraneoplastic testing and long standing/progressive course.  -- I will reach out with above data as it returns and let her know if we need any additional data prior to Ataxia clinic referral  --Genetics referral  --Ataxia clinic referral to discuss further possible testing and etiologies        Frank Osborne MD   of Neurology   [facility]      History of Presenting Symptoms:   Jaylyn Berger is a 67 year old female who presents today for evaluation of ataxia    Balance problems have been ongoing since  she suspects.  Walking would lead to knees giving out    Drinking hx--none  Falls---Fell on , knees gave out.   Speech is a tad garbled--has been told that but she perceives her speech as normal.  Can't type fast/poor coordination, has been worsening  No issues with eating or drinking  How limited--Can drive, pay bills, all ADLs.  x48 years.    Family history in this regard--men and women and mother's side all needed wheelchairs  They lived a mostly normal lifespan  Mother  at 68.   Grandfather would sway when he walked. Required a wheelchair before he .   Needs help with getting groceries to car if a lot of groceries    Her daughter can't drive at all, has similar symptoms.  Vision precludes her from driving.  She also has trouble walking/worse than Jaylyn's case      \"She saw Dr. Missael Lopez who sent for paraneoplastic panel (normal) as below and referred to the ataxia clinic at the [place].  That consult led her to our general neurology clinic here today.     Ophthalmology  Note reviewed   Plan:  - Refer to Inherited retinal disease clinic for genetic evaluation both nuclear and mitochondrial DNA testing ([facility] [place]?)and repeat ERG per that clinic.  If that is negative proceed with working up for auto-immune retinopathy   - Neurology " "evaluation 3/9/2023 for trouble walking      2.  Myopia both eyes               - patient stated today she was not diagnosed until age 24.  Another option is that she suffers from intercurrent bilateral amblyopia. Amblyopia unlikely given progression of vision loss and other syndromic characteristics including difficulty walking.     3.  Pseudophakia, OU              - Stable, monitor      4. T2DM w/o DR              - Follows with PCP. Discussed importance of glucose control and BP control. Last a1c 8.5% 2/2023.              - Monitor annually     5. Dry eye OU              - PEE OU L>R; patient asymptomatic               - Start AT QID OU PRN              - AT marcela QHS OU     6. PCO OU              - YAG cap OD first next visit\"         Past Medical History:     Patient Active Problem List   Diagnosis     Subjective visual disturbance     Past Medical History:   Diagnosis Date     Breast cancer (H)      Diabetes (H)      Hypertension         Past Surgical History:     Past Surgical History:   Procedure Laterality Date     CATARACT IOL, RT/LT Right 10/10/2018     CATARACT IOL, RT/LT Left 11/14/2018     HYSTERECTOMY       LUMPECTOMY BREAST  2010     SINUS SURGERY       TUBAL LIGATION          Social History:     Social History     Tobacco Use     Smoking status: Never     Smokeless tobacco: Never        Family History:     Family History   Problem Relation Age of Onset     Diabetes Father      Cancer Paternal Grandmother      Diabetes Paternal Grandmother      Glaucoma No family hx of      Macular Degeneration No family hx of         Medications:     Current Outpatient Medications   Medication Sig     ASPIRIN PO Take 1 tablet by mouth daily     ATENOLOL-CHLORTHALIDONE PO Take 1 tablet by mouth daily     atorvastatin (LIPITOR) 10 MG tablet Take 1 tablet by mouth daily     cholecalciferol 25 MCG (1000 UT) TABS Take 1,000 Units by mouth daily     empagliflozin (JARDIANCE) 25 MG TABS tablet Take 25 mg by mouth daily     " "GLIPIZIDE PO Take 2 tablets by mouth daily     ketorolac (ACULAR) 0.5 % ophthalmic solution  (Patient not taking: Reported on 11/11/2022)     LOSARTAN POTASSIUM PO Take 1 tablet by mouth daily     METFORMIN HCL PO Take 1 tablet by mouth daily     Multiple Vitamin (MULTI-VITAMIN PO) Take 1 tablet by mouth daily (Patient not taking: Reported on 11/11/2022)     prednisoLONE acetate (PRED FORTE) 1 % ophthalmic susp  (Patient not taking: Reported on 11/11/2022)     TRADJENTA 5 MG TABS tablet Take 1 tablet by mouth once (Patient not taking: Reported on 11/11/2022)     No current facility-administered medications for this visit.        Allergies:     Allergies   Allergen Reactions     Benicar [Olmesartan] Unknown and Cough     Ibuprofen Unknown and Cough     Lisinopril Unknown and Cough        Review of Systems:   As noted above     Physical Exam:   Vitals: /76 (BP Location: Right arm, Patient Position: Sitting, Cuff Size: Adult Regular)   Ht 1.626 m (5' 4\")   Wt 58.1 kg (128 lb)   SpO2 96%   BMI 21.97 kg/m         Neuro:   General Appearance: No apparent distress, well-nourished, well-groomed, pleasant     Mental Status: Alert and oriented to person, place, and time. Speech fluent and comprehension intact. Mild dysarthria, some scanning speech at times      Cranial Nerves:   II: Visual fields: normal  III: Pupils: 3 mm, equal, round, reactive to light   III,IV,VI: Extraocular Movements: intact without end gaze nystagmus  V: Facial sensation: intact to light touch  VII: Facial strength: intact without asymmetry  VIII: Hearing: intact grossly  IX: Palate: intact   XII: Tongue movement: normal     Motor Exam:   5/5 Diffusely  Sensory: intact to light touch, vibration diminished at ankle and great toe b/l    Coordination: Dysmetria present to FnF L>R and overshoot present to finger radha L >R    Reflexes: biceps, triceps, brachioradialis, patellar 2+ and ankle jerks 1+ and symmetric. Toes are downgoing " bilaterally    Gait: requires walker.  Struggles trial without walker.  Significant sway to romberg with eyes open and closed.         Data: Pertinent prior to visit   Imaging:  MRI 2019   INDICATION: ataxia, intermittent weakness of both legs for one year, falls. No trauma to head.       TECHNIQUE:  MRI of the head without contrast using routine protocol.     COMPARISON: None.     FINDINGS:  Normal diffusion. Mild cerebellar volume loss. Several punctate T2 and FLAIR signal hyperintensities within the cerebral white matter. Normal flow voids within the major intracranial vessels. Intraocular lens implants. Nasal septal deviation to the right.     IMPRESSION:     1. No evidence for acute infarct.   2. Mild cerebellar volume loss.   3. Several punctate T2 and FLAIR signal hyperintensities within the cerebral white matter likely represent minimal chronic microvascular ischemic changes.      MRI C spine 2022  IMPRESSION:   1. Moderate to severe bilateral neural foraminal stenosis at the C3-4 level with potential bilateral C4 nerve root effacement intraforminally.   2. Additional degenerative changes as above.   3. No evidence for subluxation or spinal cord signal abnormality.   4. Partial visualization of mixed signal intensity left thyroid nodule measuring at least 18 x 14 x 29 mm. Recommend correlation to thyroid ultrasound for further characterization    Imaging is not in PACS that I can see but reports are reviewed.        Procedures:      Laboratory:     Movement Autoimmune Eval, S   Movement Disorder Interp, S       See Note                               No informative autoantibodies were detected in this      evaluation. However, a negative result does not exclude an      autoimmune movement disorder. Sensitivity is enhanced by      testing both serum and CSF.   Amphiphysin Ab, S                 Negative          titer   <1:240             -------------------ADDITIONAL INFORMATION-------------------     This  test was developed and its performance characteristics      determined by [facility] in a manner consistent with CLIA      requirements. This test has not been cleared or approved by      the U.S. Food and Drug Administration.   AGNA-1, S                         Negative          titer   <1:240             -------------------ADDITIONAL INFORMATION-------------------     This test was developed and its performance characteristics      determined by [facility] in a manner consistent with CLIA      requirements. This test has not been cleared or approved by      the U.S. Food and Drug Administration.   MAE-1, S                         Negative          titer   <1:240     Reflex Added                      None.                                        -------------------ADDITIONAL INFORMATION-------------------     This test was developed and its performance characteristics      determined by [facility] in a manner consistent with CLIA      requirements. This test has not been cleared or approved by      the U.S. Food and Drug Administration.   MAE-2, S                         Negative          titer   <1:240             -------------------ADDITIONAL INFORMATION-------------------     This test was developed and its performance characteristics      determined by [facility] in a manner consistent with CLIA      requirements. This test has not been cleared or approved by      the U.S. Food and Drug Administration.   MAE-3, S                         Negative          titer   <1:240             -------------------ADDITIONAL INFORMATION-------------------     This test was developed and its performance characteristics      determined by [facility] in a manner consistent with CLIA      requirements. This test has not been cleared or approved by      the U.S. Food and Drug Administration.   CASPR2-IgG CBA, S                 Negative                  Negative           -------------------ADDITIONAL  INFORMATION-------------------     This test was developed and its performance characteristics      determined by [facility] in a manner consistent with CLIA      requirements. This test has not been cleared or approved by      the U.S. Food and Drug Administration.   CRMP-5-IgG, S                     Negative          titer   <1:240             -------------------ADDITIONAL INFORMATION-------------------     This test was developed and its performance characteristics      determined by [facility] in a manner consistent with CLIA      requirements. This test has not been cleared or approved by      the U.S. Food and Drug Administration.   CRMP-5-IgG Western Blot, S        Negative                  Negative           -------------------ADDITIONAL INFORMATION-------------------     This test was developed and its performance characteristics      determined by [facility] in a manner consistent with CLIA      requirements. This test has not been cleared or approved by      the U.S. Food and Drug Administration.   DPPX Ab IFA, S                    Negative                  Negative           -------------------ADDITIONAL INFORMATION-------------------     This test was developed and its performance characteristics      determined by [facility] in a manner consistent with CLIA      requirements. This test has not been cleared or approved by      the U.S. Food and Drug Administration.   GAD65 Ab Assay, S                 0.00              nmol/L  <= 0.02            -------------------ADDITIONAL INFORMATION-------------------     This test was developed and its performance characteristics      determined by [facility] in a manner consistent with CLIA      requirements. This test has not been cleared or approved by      the U.S. Food and Drug Administration.   GRAF1 IFA, S                      Negative                  Negative           -------------------ADDITIONAL INFORMATION-------------------     This test was  developed and its performance characteristics      determined by [facility] in a manner consistent with CLIA      requirements. This test has not been cleared or approved by      the U.S. Food and Drug Administration.   IgLON5 IFA, S                     Negative                  Negative           -------------------ADDITIONAL INFORMATION-------------------     This test was developed and its performance characteristics      determined by [facility] in a manner consistent with CLIA      requirements. This test has not been cleared or approved by      the U.S. Food and Drug Administration.   ITPR1 IFA, S                      Negative                  Negative           -------------------ADDITIONAL INFORMATION-------------------     This test was developed and its performance characteristics      determined by [facility] in a manner consistent with CLIA      requirements. This test has not been cleared or approved by      the U.S. Food and Drug Administration.   LGI1-IgG CBA, S                   Negative                  Negative           -------------------ADDITIONAL INFORMATION-------------------     This test was developed and its performance characteristics      determined by [facility] in a manner consistent with CLIA      requirements. This test has not been cleared or approved by      the U.S. Food and Drug Administration.   mGluR1 Ab IFA, S                  Negative                  Negative           -------------------ADDITIONAL INFORMATION-------------------     This test was developed and its performance characteristics      determined by [facility] in a manner consistent with CLIA      requirements. This test has not been cleared or approved by      the U.S. Food and Drug Administration.   NIF IFA, S                        Negative                  Negative           -------------------ADDITIONAL INFORMATION-------------------     This test was developed and its performance characteristics       determined by [facility] in a manner consistent with CLIA      requirements. This test has not been cleared or approved by      the U.S. Food and Drug Administration.   NMDA-R Ab CBA, S                  Negative                  Negative           -------------------ADDITIONAL INFORMATION-------------------     This test was developed and its performance characteristics      determined by [facility] in a manner consistent with CLIA      requirements. This test has not been cleared or approved by      the U.S. Food and Drug Administration.   P/Q-Type Calcium Channel Ab       0.00              nmol/L  <=0.02             -------------------ADDITIONAL INFORMATION-------------------     This test was developed and its performance characteristics      determined by [facility] in a manner consistent with CLIA      requirements. This test has not been cleared or approved by      the U.S. Food and Drug Administration.   PCA-1, S                          Negative          titer   <1:240             -------------------ADDITIONAL INFORMATION-------------------     This test was developed and its performance characteristics      determined by [facility] in a manner consistent with CLIA      requirements. This test has not been cleared or approved by      the U.S. Food and Drug Administration.   PCA-2, S                          Negative          titer   <1:240             -------------------ADDITIONAL INFORMATION-------------------     This test was developed and its performance characteristics      determined by [facility] in a manner consistent with CLIA      requirements. This test has not been cleared or approved by      the U.S. Food and Drug Administration.   PCA-Tr, S                         Negative          titer   <1:240             -------------------ADDITIONAL INFORMATION-------------------     This test was developed and its performance characteristics      determined by [facility] in a manner consistent with  CLIA      requirements. This test has not been cleared or approved by      the U.S. Food and Drug Administration.        The total time of this encounter today amounted to 68 minutes. This time included time spent with the patient, prep work, ordering tests, and performing post visit documentation.        Again, thank you for allowing me to participate in the care of your patient.        Sincerely,        Davidson Osborne MD

## 2023-03-09 NOTE — PATIENT INSTRUCTIONS
Blood work  MRI brain + c spine  Genetics team  Ataxia clinic   [None] : None [Good understanding] : Patient has a good understanding of lifestyle changes and steps needed to achieve self management goal

## 2023-03-09 NOTE — NURSING NOTE
"Jaylyn Berger's goals for this visit include:   Chief Complaint   Patient presents with     New Patient     Ataxia       She requests these members of her care team be copied on today's visit information: yes    PCP: Bethany Mckinnon    Referring Provider:  Referred Self, MD  No address on file    /76 (BP Location: Right arm, Patient Position: Sitting, Cuff Size: Adult Regular)   Ht 1.626 m (5' 4\")   Wt 58.1 kg (128 lb)   SpO2 96%   BMI 21.97 kg/m      Do you need any medication refills at today's visit? No  TARSHA Stack., CMA (AAMA)      "

## 2023-03-09 NOTE — TELEPHONE ENCOUNTER
Writer faxed face sheet, OV note and 2 ( TWO)  MRI orders to [facility] at [phone].  [facility] will be sending a form for Dr. Osborne to sign for outside imaging because all paperwork must have [facility] headings as told to writer by  at [facility].  Phone number to call if questions [phone]  Verified fax has been delivered by [employer].  TARSHA Stack., LISSET ([facility])

## 2023-03-09 NOTE — TELEPHONE ENCOUNTER
Writer received Radiology External order from [facility] to be signed by Dr Osborne.   Writer faxed OV note, face sheet and two orders for MRIs of the brain and cervical .  External order verified delivered by right fax.  Original External order sent to [facility] for scan.    Faxed to [facility] at [phone].

## 2023-03-10 ENCOUNTER — TELEPHONE (OUTPATIENT)
Dept: NEUROLOGY | Facility: CLINIC | Age: 68
End: 2023-03-10

## 2023-03-10 LAB
HIV 1+2 AB+HIV1 P24 AG SERPL QL IA: NONREACTIVE
T PALLIDUM AB SER QL: NONREACTIVE

## 2023-03-10 NOTE — TELEPHONE ENCOUNTER
CAlled pt cell phone and home phone to arrange for her to see Dr. Lozada and Kvng Wright in the ataxia clinic.  There was not answer at either number so a message was left. Gave this writer's call back number.

## 2023-03-11 LAB — COPPER SERPL-MCNC: 153 UG/DL

## 2023-03-12 LAB
A-TOCOPHEROL VIT E SERPL-MCNC: 7.3 MG/L
BETA+GAMMA TOCOPHEROL SERPL-MCNC: 1.3 MG/L

## 2023-03-14 LAB
ENA SS-A AB SER IA-ACNC: <0.5 U/ML
ENA SS-A AB SER IA-ACNC: NEGATIVE
ENA SS-B IGG SER IA-ACNC: <0.6 U/ML
ENA SS-B IGG SER IA-ACNC: NEGATIVE
TTG IGA SER-ACNC: 0.2 U/ML
TTG IGG SER-ACNC: <0.6 U/ML
VIT B1 PYROPHOSHATE BLD-SCNC: 106 NMOL/L

## 2023-03-14 NOTE — TELEPHONE ENCOUNTER
Called pt to help arrange an appointment at the [facility] in the Ataxia Clinic with  and Kvng Wright. No answer, message was left with return phone number.

## 2023-03-21 ENCOUNTER — TELEPHONE (OUTPATIENT)
Dept: NEUROLOGY | Facility: CLINIC | Age: 68
End: 2023-03-21
Payer: COMMERCIAL

## 2023-03-21 NOTE — TELEPHONE ENCOUNTER
Left VM re blood work.  Unrevealing besides known diabetes.  Still think genetics/movement consultation is the correct path and she has upcoming appointment with this team.

## 2023-03-21 NOTE — TELEPHONE ENCOUNTER
Action 3/21/23 MV 12.07pm   Action Taken Patient to have imaging done at  on 3/22 - request after     Action 3/24/23 MV 8.38am   Action Taken Imaging request faxed to [facility]     Action 4/17/23 MV 1.42pm   Action Taken Images resolved in PACS           RECORDS RECEIVED FROM: internal   REASON FOR VISIT: ataxia   Date of Appt: 4/21/23   NOTES (FOR ALL VISITS) STATUS DETAILS   OFFICE NOTE from referring provider Internal Dr Davidson Osborne @ [facility]:  3/9/23   OFFICE NOTE from other specialist Care Everywhere Dr Missael Lopez @ [facility]:  1/13/22 9/17/19   MEDICATION LIST Internal    IMAGING  (FOR ALL VISITS)     MRI (HEAD, NECK, SPINE) PACS [facility]:  MRI Brain 3/22/23  MRI Cervical Spine 3/22/23  MRI Cervical Spine 1/19/22  MRI Brain 9/13/19

## 2023-03-23 ENCOUNTER — TELEPHONE (OUTPATIENT)
Dept: NEUROLOGY | Facility: CLINIC | Age: 68
End: 2023-03-23
Payer: COMMERCIAL

## 2023-03-23 NOTE — TELEPHONE ENCOUNTER
Called patient regarding MRI brain and C spine findings.  Reports will be scanned in,images are in PACS.  Left VM.  No clear secondary causes seen structurally that would explain her symptoms.  Would still favor a genetic cause.  She has an appointment in April  Upcoming to further work this up.

## 2023-04-21 ENCOUNTER — OFFICE VISIT (OUTPATIENT)
Dept: NEUROLOGY | Facility: CLINIC | Age: 68
End: 2023-04-21
Payer: COMMERCIAL

## 2023-04-21 ENCOUNTER — LAB (OUTPATIENT)
Dept: LAB | Facility: CLINIC | Age: 68
End: 2023-04-21
Payer: COMMERCIAL

## 2023-04-21 ENCOUNTER — PRE VISIT (OUTPATIENT)
Dept: NEUROLOGY | Facility: CLINIC | Age: 68
End: 2023-04-21

## 2023-04-21 VITALS
HEART RATE: 87 BPM | OXYGEN SATURATION: 98 % | DIASTOLIC BLOOD PRESSURE: 60 MMHG | SYSTOLIC BLOOD PRESSURE: 107 MMHG | WEIGHT: 126.9 LBS | BODY MASS INDEX: 21.78 KG/M2

## 2023-04-21 DIAGNOSIS — H35.52 CONE-ROD DYSTROPHY: ICD-10-CM

## 2023-04-21 DIAGNOSIS — R27.0 ATAXIA: ICD-10-CM

## 2023-04-21 DIAGNOSIS — R27.0 ATAXIA: Primary | ICD-10-CM

## 2023-04-21 PROCEDURE — 99215 OFFICE O/P EST HI 40 MIN: CPT | Mod: GC | Performed by: PSYCHIATRY & NEUROLOGY

## 2023-04-21 PROCEDURE — 81179 ATXN2 GENE DETC ABNOR ALLELE: CPT | Mod: 90 | Performed by: PATHOLOGY

## 2023-04-21 PROCEDURE — 99207 PR NO CHARGE LOS: CPT | Performed by: GENETIC COUNSELOR, MS

## 2023-04-21 PROCEDURE — 99000 SPECIMEN HANDLING OFFICE-LAB: CPT | Performed by: PATHOLOGY

## 2023-04-21 PROCEDURE — 36415 COLL VENOUS BLD VENIPUNCTURE: CPT | Performed by: PATHOLOGY

## 2023-04-21 ASSESSMENT — PAIN SCALES - GENERAL: PAINLEVEL: NO PAIN (0)

## 2023-04-21 NOTE — LETTER
2023       RE: Jaylyn Berger  [address] [facility] [address]     Dear Colleague,    Thank you for referring your patient, Jaylyn Berger, to the [facility] NEUROLOGY CLINIC [facility]. Please see a copy of my visit note below.    GENETIC COUNSELING-Neurology  Jaylyn Berger was seen today for genetic consultation and neurologic exam. I spent a total of 40 minutes with the patient with the majority of the time being spent on genetic counseling and testing options. The patient also underwent a neurological examination from Dr. Young and Neville after our visit.      MEDICAL HISTORY  Please see Dr. Young's notes for a more thorough summary of medical history. Briefly, Jaylyn reports onset of neurologic symptoms in her late 50's and she has also been diagnosed with a cone-ash dystrophy.  She is scheduled to consider genetic testing for the cone-ash dystrophy later this year in our genetics-eye clinic.     FAMILY HISTORY-see scanned pedigree  1. Jaylyn's daughter is also reported to have ataxia, although we did not examine her today. She is also reported to have significant vision issues and cannot drive.  2. Jaylyn reported that her mother  in  at age 68 and was in a wheelchair at the time of her death. Jaylyn was not certain if her mother had vision loss.  3. Jaylyn reported that three of her mother's siblings and one maternal cousin are wheelchair bound and some may also have vision issues. Details were not certain for these individuals.  4. Jaylyn's maternal grandfather was wheelchair bound when he .     GENETIC COUNSELING  We discussed the genetic and environmental basis of neurologic disease. I explained that in most cases, it results from complex and lifelong interaction of multiple genetic and environmental factors. In some cases, there may be a single gene cause. I explained that the presence of  additional family members and/or young ages at diagnosis are typically clues for a single-gene cause.    We discussed the genetic basis of the ataxias. I explained that there are nearly 100 primary genetic ataxias- and several hundred additional conditions that can include ataxia as part of a more complex phenotype.    I explained that there is one specific ataxia that typically includes both retinal dystrophy and ataxia-SCA7. I explained that this condition is caused by expansion of a trinucleotide repeat in the ATXN7 gene.  Given the reported history in her family and her confirmed findings of ataxia and cone-ash dystrophy, we think it is very reasonable to start with a targeted test for this one specific condition.    If this test is positive, it would confirm a diagnosis of autosomal dominant SCA7. We briefly discussed the implications of this diagnosis in terms of risks for siblings, children, and grandchildren. We specifically addressed the fact that this condition is often characterized by very dramatic anticipation in which the age of onset becomes progressively younger in families- and can present as a fatal  condition. We discussed the availabililty of reproductive options such as PGD.    I explained that if the SCA7 test is negative, then we would consider expanding the testing to include the other common repeat-mediated ataxias. If that is negative, we would consider sequencing based tests- although at that point- we would likely coordinate efforts with our colleagues in the genetics eye clinic.     PLAN  Genetic testing for SCA7 sent to [facility] [[email or web address] I will contact Jaylyn with results. If negative, discuss additional testing options.        Again, thank you for allowing me to participate in the care of your patient.      Sincerely,    Robert Wright GC

## 2023-04-21 NOTE — PROGRESS NOTES
"Department of Neurology  Movement Disorders Division   Ataxia Clinic  New Patient Visit    Patient: Jaylyn Berger   MRN: [id number]   : 1955   Date of Visit: 2023  PCP: No primary care provider on file.   Referring provider: No ref. provider found    CC:  Balance, coordination issues    HPI:  Ms. Berger is a 67 year old right-handed female with history significant for diabetes who presents to movement disorder clinic for imbalance & coordination concerns. She is unaccompanied.    She reports that she started using a cane in . She has gradually progressed and now uses a walker because her legs would give out. She sometimes wears a brace on her knee which can be helpful. She did fall recently, suddenly fell. Her  had to help her up. She is very careful with stairs. Has fallen down a couple of times in the past.     She has more difficulty with hands also, can no longer type like she used to. 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     She has not noticed any difficulty with speech or swallowing, but another doctor told her that her speech is very slightly affected.    Some urinary urgency/incontinence, triggered by standing. Has been happening at least two year.     She was diagnosed with breast cancer in . Followed with oncology for 5 years, discharged from clinic. Underwent chemotherapy and radiation for this.     MEDICATIONS reviewed & pertinent for:  None    ROS:  All others negative except as listed above.    Past Medical History:   Diagnosis Date     Breast cancer (H)      Diabetes (H)      Hypertension         Past Surgical History:   Procedure Laterality Date     CATARACT IOL, RT/LT Right 10/10/2018     CATARACT IOL, RT/LT Left 2018     HYSTERECTOMY       LUMPECTOMY BREAST  2010     SINUS SURGERY       TUBAL LIGATION            Current Outpatient Medications:      ASPIRIN PO, Take 1 tablet by mouth daily, Disp: , Rfl:      " ATENOLOL-CHLORTHALIDONE PO, Take 1 tablet by mouth daily, Disp: , Rfl:      atorvastatin (LIPITOR) 10 MG tablet, Take 1 tablet by mouth daily, Disp: , Rfl:      cholecalciferol 25 MCG (1000 UT) TABS, Take 1,000 Units by mouth daily, Disp: , Rfl:      empagliflozin (JARDIANCE) 25 MG TABS tablet, Take 25 mg by mouth daily, Disp: , Rfl:      GLIPIZIDE PO, Take 2 tablets by mouth daily, Disp: , Rfl:      ketorolac (ACULAR) 0.5 % ophthalmic solution, , Disp: , Rfl:      LOSARTAN POTASSIUM PO, Take 1 tablet by mouth daily, Disp: , Rfl:      METFORMIN HCL PO, Take 1 tablet by mouth daily, Disp: , Rfl:      Multiple Vitamin (MULTI-VITAMIN PO), Take 1 tablet by mouth daily, Disp: , Rfl:      prednisoLONE acetate (PRED FORTE) 1 % ophthalmic susp, , Disp: , Rfl:      TRADJENTA 5 MG TABS tablet, Take 1 tablet by mouth once, Disp: , Rfl: 2     Allergies   Allergen Reactions     Benicar [Olmesartan] Unknown and Cough     Ibuprofen Unknown and Cough     Lisinopril Unknown and Cough        Family History   Problem Relation Age of Onset     Diabetes Father      Cancer Paternal Grandmother      Diabetes Paternal Grandmother      Glaucoma No family hx of      Macular Degeneration No family hx of       Social History:  She  reports that she has never smoked. She has never used smokeless tobacco. lives with  and daughter in a split level. Still working    PHYSICAL EXAM:  /60 (BP Location: Right arm, Patient Position: Sitting, Cuff Size: Adult Regular)   Pulse 87   Wt 57.6 kg (126 lb 14.4 oz)   SpO2 98%   BMI 21.78 kg/m       Gen: alert, active, attentive, appropriately groomed     NEURO:  MS: Alert and oriented to person, place, time, and situation.  Speech normal to comprehension.  Recent and remote memory intact.  Attention and concentration normal.  Fund of knowledge normal.    CN:  Pupils equal, round, and reactive to light. VFF. EOM normal range, no nystagmus.  Normal saccades. Facial sensation intact. Face  symmetric at rest and with activation. Hearing grossly intact to conversation. Palate rise b/l, uvula midline.  Slight dysarthria. Shoulder shrug symmetric and without lag bilaterally. Tongue protrudes midline. No fasciculation or atrophy noted.    Motor:  Normal bulk and tone throughout upper and lower extremities.  No abnormal movements or fasciculations observed. Strength is 5/5 throughout and symmetric.     Sensation:  Intact to LT, and temperature in all extremities. Vibration 8 sec toe, >10 ankle bilaterally     Coordination:  FTN and HTN with dysmetria bilaterally.    Gait:  Wide based unsteady gait. Unable to complete tandem gait or  tandem pose for more than a couple of secs but able to stand feet together x10 sec.    DATA REVIEWED:   Latest Reference Range & Units 03/09/23 11:07   Copper 80.0 - 155.0 ug/dL 153.0   Hemoglobin A1C 0.0 - 5.6 % 7.9 (H)   Tissue Transglutaminase Antibody IgA <7.0 U/mL 0.2   Tissue Transglutaminase Christie IgG <7.0 U/mL <0.6   TSH 0.40 - 4.00 mU/L 0.90   Vitamin B1 Whole Blood Level 70 - 180 nmol/L 106   Vitamin B12 232 - 1,245 pg/mL 585   Vitamin E 5.5 - 18.0 mg/L 7.3   Vitamin E Gamma 0.0 - 6.0 mg/L 1.3   Treponema Antibodies Nonreactive  Nonreactive   HIV Antigen Antibody Combo Nonreactive  Nonreactive   SSA (Ro) Antibody IgG Negative  Negative   SSA Christie IgG Instrument Value <7.0 U/mL <0.5   SSB (La) Antibody IgG Negative  Negative   SSB Christie IgG Instrument Value <7.0 U/mL <0.6   (H): Data is abnormally high    MRI brain with and without cont 3/22/23 - personally reviewed & notable for mild cerebellar atrophy diffusely  IMPRESSION:       1. No acute intracranial abnormality.   2. Mild senescent changes of global cerebral volume loss and chronic microvascular ischemia.     MRI C spine 3/22/23  IMPRESSION:       Stable degenerative changes of the cervical spine compared to 01/19/2022.       ASSESSMENT:  Ms. Berger is a 67 year old right-handed female with history  significant for diabetes who presents to movement disorder clinic for imbalance & coordination concerns. She does have ataxia on exam today. She notes diagnosis of cone-ash dystrophy and has a daughter with coordination problems and visual problems as well. Potentially hereditary ataxia syndrome, notably SCA7 can include both ataxia and retinal dystrophy. Patient would like to proceed with genetic testing, which is reasonable.     PLAN:  - testing for SCA7, could broaden in the future if negative, high suspicion for genetic ataxia syndrome    RTC pending results    Patient and plan was examined & discussed with attending physician, Dr. Lozada.     Rosa Young MD  Movement Disorders Fellow

## 2023-04-21 NOTE — PROGRESS NOTES
GENETIC COUNSELING-Neurology  Jaylyn Berger was seen today for genetic consultation and neurologic exam. I spent a total of 40 minutes with the patient with the majority of the time being spent on genetic counseling and testing options. The patient also underwent a neurological examination from Dr. Young and Neville after our visit.      MEDICAL HISTORY  Please see Dr. Young's notes for a more thorough summary of medical history. Briefly, Jaylyn reports onset of neurologic symptoms in her late 50's and she has also been diagnosed with a cone-ash dystrophy.  She is scheduled to consider genetic testing for the cone-ash dystrophy later this year in our genetics-eye clinic.     FAMILY HISTORY-see scanned pedigree  1. Jaylyn's daughter is also reported to have ataxia, although we did not examine her today. She is also reported to have significant vision issues and cannot drive.  2. Jaylyn reported that her mother  in  at age 68 and was in a wheelchair at the time of her death. Jaylyn was not certain if her mother had vision loss.  3. Jaylyn reported that three of her mother's siblings and one maternal cousin are wheelchair bound and some may also have vision issues. Details were not certain for these individuals.  4. Jaylyn's maternal grandfather was wheelchair bound when he .     GENETIC COUNSELING  We discussed the genetic and environmental basis of neurologic disease. I explained that in most cases, it results from complex and lifelong interaction of multiple genetic and environmental factors. In some cases, there may be a single gene cause. I explained that the presence of additional family members and/or young ages at diagnosis are typically clues for a single-gene cause.    We discussed the genetic basis of the ataxias. I explained that there are nearly 100 primary genetic ataxias- and several hundred additional conditions that can include ataxia as part of a more complex phenotype.    I  explained that there is one specific ataxia that typically includes both retinal dystrophy and ataxia-SCA7. I explained that this condition is caused by expansion of a trinucleotide repeat in the ATXN7 gene.  Given the reported history in her family and her confirmed findings of ataxia and cone-ash dystrophy, we think it is very reasonable to start with a targeted test for this one specific condition.    If this test is positive, it would confirm a diagnosis of autosomal dominant SCA7. We briefly discussed the implications of this diagnosis in terms of risks for siblings, children, and grandchildren. We specifically addressed the fact that this condition is often characterized by very dramatic anticipation in which the age of onset becomes progressively younger in families- and can present as a fatal  condition. We discussed the availabililty of reproductive options such as PGD.    I explained that if the SCA7 test is negative, then we would consider expanding the testing to include the other common repeat-mediated ataxias. If that is negative, we would consider sequencing based tests- although at that point- we would likely coordinate efforts with our colleagues in the genetics eye clinic.     PLAN  Genetic testing for SCA7 sent to [facility]. I will contact Jaylyn with results. If negative, discuss additional testing options.    Kvng Wright MS, [facility] [place]  Certified Genetic Counselor  [facility] Adult Neurology and Ataxia Clinics

## 2023-04-21 NOTE — LETTER
"2023      RE: Jaylyn Berger  [address] [facility] [address]       Department of Neurology  Movement Disorders Division   Ataxia Clinic  New Patient Visit    Patient: Jaylyn Berger   MRN: [id number]   : 1955   Date of Visit: 2023  PCP: No primary care provider on file.   Referring provider: No ref. provider found    CC:  Balance, coordination issues    HPI:  Ms. Berger is a 67 year old right-handed female with history significant for diabetes who presents to movement disorder clinic for imbalance & coordination concerns. She is unaccompanied.    She reports that she started using a cane in . She has gradually progressed and now uses a walker because her legs would give out. She sometimes wears a brace on her knee which can be helpful. She did fall recently, suddenly fell. Her  had to help her up. She is very careful with stairs. Has fallen down a couple of times in the past.     She has more difficulty with hands also, can no longer type like she used to. 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     She has not noticed any difficulty with speech or swallowing, but another doctor told her that her speech is very slightly affected.    Some urinary urgency/incontinence, triggered by standing. Has been happening at least two year.     She was diagnosed with breast cancer in . Followed with oncology for 5 years, discharged from clinic. Underwent chemotherapy and radiation for this.     MEDICATIONS reviewed & pertinent for:  None    ROS:  All others negative except as listed above.    Past Medical History:   Diagnosis Date    Breast cancer (H)     Diabetes (H)     Hypertension         Past Surgical History:   Procedure Laterality Date    CATARACT IOL, RT/LT Right 10/10/2018    CATARACT IOL, RT/LT Left 2018    HYSTERECTOMY      LUMPECTOMY BREAST  2010    SINUS SURGERY      TUBAL LIGATION            Current Outpatient Medications:     " ASPIRIN PO, Take 1 tablet by mouth daily, Disp: , Rfl:     ATENOLOL-CHLORTHALIDONE PO, Take 1 tablet by mouth daily, Disp: , Rfl:     atorvastatin (LIPITOR) 10 MG tablet, Take 1 tablet by mouth daily, Disp: , Rfl:     cholecalciferol 25 MCG (1000 UT) TABS, Take 1,000 Units by mouth daily, Disp: , Rfl:     empagliflozin (JARDIANCE) 25 MG TABS tablet, Take 25 mg by mouth daily, Disp: , Rfl:     GLIPIZIDE PO, Take 2 tablets by mouth daily, Disp: , Rfl:     ketorolac (ACULAR) 0.5 % ophthalmic solution, , Disp: , Rfl:     LOSARTAN POTASSIUM PO, Take 1 tablet by mouth daily, Disp: , Rfl:     METFORMIN HCL PO, Take 1 tablet by mouth daily, Disp: , Rfl:     Multiple Vitamin (MULTI-VITAMIN PO), Take 1 tablet by mouth daily, Disp: , Rfl:     prednisoLONE acetate (PRED FORTE) 1 % ophthalmic susp, , Disp: , Rfl:     TRADJENTA 5 MG TABS tablet, Take 1 tablet by mouth once, Disp: , Rfl: 2     Allergies   Allergen Reactions    Benicar [Olmesartan] Unknown and Cough    Ibuprofen Unknown and Cough    Lisinopril Unknown and Cough        Family History   Problem Relation Age of Onset    Diabetes Father     Cancer Paternal Grandmother     Diabetes Paternal Grandmother     Glaucoma No family hx of     Macular Degeneration No family hx of       Social History:  She  reports that she has never smoked. She has never used smokeless tobacco. lives with  and daughter in a split level. Still working    PHYSICAL EXAM:  /60 (BP Location: Right arm, Patient Position: Sitting, Cuff Size: Adult Regular)   Pulse 87   Wt 57.6 kg (126 lb 14.4 oz)   SpO2 98%   BMI 21.78 kg/m       Gen: alert, active, attentive, appropriately groomed     NEURO:  MS: Alert and oriented to person, place, time, and situation.  Speech normal to comprehension.  Recent and remote memory intact.  Attention and concentration normal.  Fund of knowledge normal.    CN:  Pupils equal, round, and reactive to light. VFF. EOM normal range, no nystagmus.  Normal  saccades. Facial sensation intact. Face symmetric at rest and with activation. Hearing grossly intact to conversation. Palate rise b/l, uvula midline.  Slight dysarthria. Shoulder shrug symmetric and without lag bilaterally. Tongue protrudes midline. No fasciculation or atrophy noted.    Motor:  Normal bulk and tone throughout upper and lower extremities.  No abnormal movements or fasciculations observed. Strength is 5/5 throughout and symmetric.     Sensation:  Intact to LT, and temperature in all extremities. Vibration 8 sec toe, >10 ankle bilaterally     Coordination:  FTN and HTN with dysmetria bilaterally.    Gait:  Wide based unsteady gait. Unable to complete tandem gait or  tandem pose for more than a couple of secs but able to stand feet together x10 sec.    DATA REVIEWED:   Latest Reference Range & Units 03/09/23 11:07   Copper 80.0 - 155.0 ug/dL 153.0   Hemoglobin A1C 0.0 - 5.6 % 7.9 (H)   Tissue Transglutaminase Antibody IgA <7.0 U/mL 0.2   Tissue Transglutaminase Christie IgG <7.0 U/mL <0.6   TSH 0.40 - 4.00 mU/L 0.90   Vitamin B1 Whole Blood Level 70 - 180 nmol/L 106   Vitamin B12 232 - 1,245 pg/mL 585   Vitamin E 5.5 - 18.0 mg/L 7.3   Vitamin E Gamma 0.0 - 6.0 mg/L 1.3   Treponema Antibodies Nonreactive  Nonreactive   HIV Antigen Antibody Combo Nonreactive  Nonreactive   SSA (Ro) Antibody IgG Negative  Negative   SSA Christie IgG Instrument Value <7.0 U/mL <0.5   SSB (La) Antibody IgG Negative  Negative   SSB Christie IgG Instrument Value <7.0 U/mL <0.6   (H): Data is abnormally high    MRI brain with and without cont 3/22/23 - personally reviewed & notable for mild cerebellar atrophy diffusely  IMPRESSION:       1. No acute intracranial abnormality.   2. Mild senescent changes of global cerebral volume loss and chronic microvascular ischemia.     MRI C spine 3/22/23  IMPRESSION:       Stable degenerative changes of the cervical spine compared to 01/19/2022.       ASSESSMENT:  Ms. Berger is a 67 year old  right-handed female with history significant for diabetes who presents to movement disorder clinic for imbalance & coordination concerns. She does have ataxia on exam today. She notes diagnosis of cone-ash dystrophy and has a daughter with coordination problems and visual problems as well. Potentially hereditary ataxia syndrome, notably SCA7 can include both ataxia and retinal dystrophy. Patient would like to proceed with genetic testing, which is reasonable.     PLAN:  - testing for SCA7, could broaden in the future if negative, high suspicion for genetic ataxia syndrome    RTC pending results    Patient and plan was examined & discussed with attending physician, Dr. Lozada.     Rosa Young MD  Movement Disorders Fellow    Attestation signed by Delilah Lozada MD at 5/19/2023 11:29 AM:  I have personally interviewed and examined Ms. Jaylyn Berger and agree with diagnosis and management. The total time spent with the patient was 60 minutes, over 50% of the time spent in counseling and coordinating care.      Delilah Lozada MD

## 2023-04-30 ENCOUNTER — HEALTH MAINTENANCE LETTER (OUTPATIENT)
Age: 68
End: 2023-04-30

## 2023-05-09 LAB — SCANNED LAB RESULT: NORMAL

## 2023-05-11 ENCOUNTER — TELEPHONE (OUTPATIENT)
Dept: LAB | Facility: CLINIC | Age: 68
End: 2023-05-11
Payer: COMMERCIAL

## 2023-05-11 NOTE — PROGRESS NOTES
GENETIC COUNSELING-Ataxia clinic  I left a message for Jaylyn that her genetic testing for SCA7 is NORMAL and excludes this diagnosis.  This leaves us with two possibilites. The first is that the retinal findings and the ataxia in the family are separate entities. To evaluate this possibility, I think the next step would be to complete the full ataxia repeat expansion panel to evaluate for the other common repeat-mediated forms of ataxia. If one of these genes provided the explanation for the ataxia, then we would suspect that the retinal disorder might be a separate issue as most of these other forms of ataxia are not assocaited with retinopathy.      Alternatively, there could be a different unifying explanation for the retinal and ataxia symptoms.  The eye clinic had discussed possible mtDNA testing in this regard.     I asked Jaylyn to call me back to discuss next steps    Kvng Wright MS, [facility]  Certified Genetic COunselor

## 2023-05-19 PROBLEM — E11.9 DIABETES MELLITUS, TYPE 2 (H): Status: ACTIVE | Noted: 2023-05-19

## 2023-07-23 ENCOUNTER — HEALTH MAINTENANCE LETTER (OUTPATIENT)
Age: 68
End: 2023-07-23

## 2023-07-27 DIAGNOSIS — H35.52 CONE-ROD DYSTROPHY: Primary | ICD-10-CM

## 2023-08-14 ENCOUNTER — TRANSFERRED RECORDS (OUTPATIENT)
Dept: HEALTH INFORMATION MANAGEMENT | Facility: CLINIC | Age: 68
End: 2023-08-14

## 2023-08-14 ENCOUNTER — OFFICE VISIT (OUTPATIENT)
Dept: OPHTHALMOLOGY | Facility: CLINIC | Age: 68
End: 2023-08-14
Attending: OPHTHALMOLOGY
Payer: COMMERCIAL

## 2023-08-14 DIAGNOSIS — H35.52 CONE-ROD DYSTROPHY: Primary | ICD-10-CM

## 2023-08-14 DIAGNOSIS — Z13.71 ENCOUNTER FOR NONPROCREATIVE GENETIC COUNSELING AND TESTING: ICD-10-CM

## 2023-08-14 DIAGNOSIS — H26.491 PCO (POSTERIOR CAPSULAR OPACIFICATION), RIGHT: Primary | ICD-10-CM

## 2023-08-14 DIAGNOSIS — Z71.83 ENCOUNTER FOR NONPROCREATIVE GENETIC COUNSELING AND TESTING: ICD-10-CM

## 2023-08-14 DIAGNOSIS — H35.52 CONE-ROD DYSTROPHY: ICD-10-CM

## 2023-08-14 PROCEDURE — 66821 AFTER CATARACT LASER SURGERY: CPT | Mod: RT | Performed by: OPHTHALMOLOGY

## 2023-08-14 PROCEDURE — 99207 FUNDUS AUTOFLUORESCENCE IMAGE (FAF) OU (BOTH EYES): CPT | Mod: 26 | Performed by: OPHTHALMOLOGY

## 2023-08-14 PROCEDURE — 92250 FUNDUS PHOTOGRAPHY W/I&R: CPT | Performed by: OPHTHALMOLOGY

## 2023-08-14 PROCEDURE — 92134 CPTRZ OPH DX IMG PST SGM RTA: CPT | Performed by: OPHTHALMOLOGY

## 2023-08-14 PROCEDURE — 92134 CPTRZ OPH DX IMG PST SGM RTA: CPT | Mod: 26 | Performed by: OPHTHALMOLOGY

## 2023-08-14 PROCEDURE — G0463 HOSPITAL OUTPT CLINIC VISIT: HCPCS | Mod: 25 | Performed by: OPHTHALMOLOGY

## 2023-08-14 PROCEDURE — 99214 OFFICE O/P EST MOD 30 MIN: CPT | Mod: 25 | Performed by: OPHTHALMOLOGY

## 2023-08-14 PROCEDURE — 96040 HC GENETIC COUNSELING, EACH 30 MINUTES: CPT

## 2023-08-14 ASSESSMENT — CONF VISUAL FIELD
OS_NORMAL: 1
OD_SUPERIOR_TEMPORAL_RESTRICTION: 0
OD_INFERIOR_NASAL_RESTRICTION: 0
OS_SUPERIOR_TEMPORAL_RESTRICTION: 0
OD_SUPERIOR_NASAL_RESTRICTION: 0
OD_INFERIOR_TEMPORAL_RESTRICTION: 0
METHOD: COUNTING FINGERS
OS_INFERIOR_TEMPORAL_RESTRICTION: 0
OS_SUPERIOR_NASAL_RESTRICTION: 0
OS_INFERIOR_NASAL_RESTRICTION: 0
OD_NORMAL: 1

## 2023-08-14 ASSESSMENT — TONOMETRY
OS_IOP_MMHG: 13
OD_IOP_MMHG: 13
IOP_METHOD: ICARE

## 2023-08-14 ASSESSMENT — EXTERNAL EXAM - RIGHT EYE: OD_EXAM: NORMAL

## 2023-08-14 ASSESSMENT — VISUAL ACUITY
METHOD: SNELLEN - LINEAR
OD_SC: 20/70
OS_SC+: -1
OS_SC: 20/70

## 2023-08-14 ASSESSMENT — SLIT LAMP EXAM - LIDS
COMMENTS: NORMAL
COMMENTS: NORMAL

## 2023-08-14 ASSESSMENT — CUP TO DISC RATIO
OD_RATIO: 0.5
OS_RATIO: 0.5

## 2023-08-14 ASSESSMENT — EXTERNAL EXAM - LEFT EYE: OS_EXAM: NORMAL

## 2023-08-14 NOTE — NURSING NOTE
Chief Complaints and History of Present Illnesses   Patient presents with    Retinal Evaluation     Chief Complaint(s) and History of Present Illness(es)       Retinal Evaluation              Laterality: both eyes    Course: stable    Associated symptoms: floaters.  Negative for eye pain and flashes    Treatments tried: artificial tears              Comments    Here for retinal evaluation. Vision is about the same. Stable floaters without flashes. No eye pain. Uses AT as needed.    Demarco Mathew COT 9:38 AM August 14, 2023

## 2023-08-14 NOTE — PROGRESS NOTES
[facility] Genetic Counseling Note     Date of Visit: 23     Presenting Information: Jaylyn Berger is a 68 year old year old female who was seen for genetic counseling at the request of Dr. 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  Genetic counseling was requested to obtain family history, to discuss the genetic details of cone ash dystrophies, and to coordinate genetic testing. Jaylyn was accompanied to today's appointment by her daughter, Azul, who gave me permission to open her medical chart and share information from her genetic counseling/testing.     In addition to her vision symptoms, Jaylyn has had neurologic symptoms since her late 50s. She struggles with gait, balance and possible urinary incontinence. She had SCA7 genetic testing through the [facility], coordinated by genetic counselor Kvng Wright, which was negative. He recommended additional expanded ataxia testing.     Family History:  A three generation pedigree was previously obtained in [facility] and scanned into the electronic medical record. The relevant portions are described below:    Children- Jaylyn's daughter Azul has progressive central vision loss and nyctalopia. She had a [facility] and ffERG and these were consistent with cone-ash dystrophy.  She is also reported to have ataxia and is unable to drive. Azul does not have children. Jaylyn's other daughter is well and has no children.  Parents- Jaylyn reported that her mother  in  at age 68 and was in a wheelchair at the time of her death. Jaylyn was not certain if her mother had vision loss.  Maternal relatives- Jaylyn reported that three of her mother's siblings and one maternal cousin are wheelchair bound and some may also have vision issues. Details were not certain for these individuals. Jaylyn's maternal grandfather was wheelchair bound when he .  Paternal  relatives- No vision or mobility abnormalities reported.    Family history is otherwise largely non-contributory. Ancestry deferred. Consanguinity was denied.      Genetic Counseling Discussion:  For review, our  bodies are made of cells that each contain a nucleus which houses our chromosomes which are made up of long stretches of DNA containing our nuclear genes. Our nuclear genes serve as the instructions for our bodies to grow and function. We have two copies of each nuclear gene, one inherited from our mother and one inherited from our father. Each of our cells also contain structures called mitochondria. Our cells have many mitochondria which provide energy for the cell and help the cell carry out its' functions. Mitochondria are unique structures in that they contain their own set of DNA called mtDNA, which consists of 37 genes. Mitochondria, and therefore our mtDNA, are only inherited from our mother. Changes in the codes of our nuclear DNA and mtDNA, called mutations, can cause mitochondrial disorders.      Retinal dystrophy is a broad category of eye conditions that are all associated with breakdown or degeneration of the retina.  Depending on the specific condition, this degeneration can affect the various different cells types of the retina, and therefore, the specific symptoms can vary significantly from one retinal dystrophy condition to another.  Most commonly affected areas of the retina include the photoreceptors (rods and cones) or the retinal pigment epithelium (RPE).  Many of these retinal dystrophy conditions are associated with progressive vision loss, but different types of retinal dystrophies exhibit variation in the speed of progression and degree of severity.  Additionally, some forms of retinal dystrophy are congenital, while others have a childhood or adult onset.  We discussed that many of the retinal dystrophies can present with overlapping symptoms and variable family histories, and so  "it is often difficult to categorize the specific form of retinal dystrophy in a particular individual without the use of genetic testing.     Some forms of retinal dystrophy are \"non-syndromic,\" meaning that the only symptoms associated with those genetic conditions are eye-related; examples of non-syndromic retinal dystrophy conditions include cone-ash dystrophy, retinitis pigmentosa, achromatopsia, and Geovani congenital amaurosis.  On the other hand, there are also \"syndromic\" forms of retinal dystrophy, in which there are associated additional medical/developmental issues, along with the eye symptoms.  Some examples of syndromic retinal dystrophies include Usher syndrome, Bardet Biedl syndrome, and Heron syndrome.  Because of the variable onset and variable clinical presentation of these conditions, it can often be difficult to determine in a young child if they have a syndromic or non-syndromic form of retinal dystrophy.     We discussed that retinal dystrophies are genetic conditions that can show a variety of different inheritance patterns depending on the specific underlying gene involved for that affected individual.  Some retinal dystrophy conditions are associated with an autosomal recessive pattern of inheritance, meaning an individual needs two changes (pathogenic variants), one on each copy of the gene, in order to be affected.  Some forms of retinal dystrophies show an X-linked recessive pattern of inheritance, meaning a gene on the X chromosome has a change (pathogenic variant). Generally males are more severely affected with X-linked disorders than females. Other retinal dystrophies can have an autosomal dominant pattern of inheritance, meaning a single change (pathogenic variant) on one copy of the gene is enough to cause the condition. For individuals with dominant inheritance, they can be inherited from an affected parent or brand new in the affected individual (de dave). For individuals with " dominant conditions, there is a 50% nisha of passing the condition to a child. More rarely, retinal dystrophies can be cause by changes in the mitochondrial DNA. This is maternally inherited.     There are currently over 300 genes that have been associated with retinal dystrophy conditions.  Some of these genes have gene therapy treatment options, where individuals have been shown to have their vision loss reversed/improved.  Only determination of the underlying genetic cause of retinal dystrophy (via genetic testing) will allow individuals to know if they are candidates for gene therapy.  We reviewed the availability of genetic testing via Next Generation Sequencing (NGS) for analysis of genes known to be associated with retinal dystrophy along with the mitochondrial DNA, with the aim of determining what condition is causing Azul's eye symptoms.      Given Jaylyn's vision symptoms and nonspecific muscle weakness, we considered the possibility of two separate genetic causes for these condition, non-genetic causes, or possibly a mitochondrial disorder that could explain both symptoms. We discussed the availability of a multi gene panel offered through  [employer]. This panel analyzes ~300 nuclear genes associated with inherited retinal disorders and the entire mtDNA 37 genes. This test is performed through a sponsored genetic testing program called [facility]. This testing is performed free of charge but by participating in the program, participants agree to have de-identified genetic data shared with the [employer] and must register with the organization.      We went on to discuss the details, limitations, and possible outcomes of NGS. In particular, we discussed that there are three possible results from NGS:  Negative: meaning normal or no mutations are identified in the genes that were tested/sequenced  Positive: meaning a variant that is known to be associated with a  particular set of symptoms is identified (pathogenic/likely pathogenic variant)  Variant of uncertain significance (VUS): meaning a change in the DNA sequence of a particular gene was seen but there is not enough information or data yet to know if it explains the symptoms. If a VUS is identified, testing of other relatives may be helpful to provide clarification.  In most cases, identification of a VUS does not confirm a diagnosis and does not result in any clinically actionable recommendations.     We discussed the potential benefits of genetic testing and why this genetic testing is medically indicated. A positive result will help determine the etiology of the ocular abnormalities noted in Azul and may guide the medical management for her, particularly if a syndromic cause of these ocular symptoms is found.  Additionally, for many of these genes, there is information available about expected prognosis or new treatment options.  Also, if a genetic cause is found for Amoss ocular abnormalities, it will give us a more accurate risk assessment for other family members, particularly Jaylyn's daughters.      We also discussed the possibility that this test could turn up no definitive answers about the underlying cause of Jennifers ocular abnormalties.  We talked about that a negative genetic test would not rule out a genetic cause for Jennifers ocular abnormalities, as it is likely that not all the genes associated with cone ash dystrophy and related ocular findings are currently known.       Next Generation Sequencing is a well established technology utilized by all molecular genetic labs throughout the country for identifying disease-causing mutations in various genes.  Next Generation Sequencing is currently the standard of care for genetic testing of single genes.  The recommended testing for Jaylyn  is DIAGNOSTIC testing, and it is NOT investigational.    Jaylyn consented for genetic testing today. A  buccal sample was collected in clinic today and sent to [facility] for testing. I will call them with results in 6-8 weeks.    It was a pleasure meeting Jaylyn and her daughter Azul today. She was encouraged to reach out to me if she has any further questions.     Plan:  [facility] sponsored testing; buccal collected in clinic today.  Results expected in 6-8 weeks; I will call when available.  Additional recommendations per Dr. Flores.    Yadira Flores MS, [facility]  Genetic Counseling  [facility]  Pager: [phone]  Phone: [phone]  Fax: [phone]    Time spent in consultation face to face was approximately 20 minutes.

## 2023-08-14 NOTE — PROGRESS NOTES
CC: Decreased vision each eye   First appointment with me  Referred by: Dr. Quinteros  HPI: Jaylyn Berger is a 68 year old year-old patient with history of cataract extraction with IOL implantation each eye in 2018. Had a mfERG prior to the cataract surgery that showed decreased central visual sensitivity in both eyes. Patient reports that since her last visit, her vision has remained stable. Patient is being referred to [facility] clinic for evaluation.       Retinal Dystrophy assessment:  Nyctalopia: Yes  Problems going from outside bright light to inside: No  Problems going from inside to bright light outside: No   Photosensitivity: No  Problems with steps, curbs, or stairs: No  Problems with color vision: No  Sees flashing lights: No    PMH:   Breast Ca 2010, surgery, chemotherapy September - December 2010 (tamoxifen), radiation in 2011  Type II diabetes  Hypertension     Walking difficulties-   problems with gait and balance and possible Urinary incontinence. Occasional stiffness and pain in her bilateral lower extremities     MRI of the cervical spine 3.2023 showed Stable degenerative changes of the cervical spine compared to 01/19/2022.   Brain MRI performed in 2019 for ataxia and weakness both legs: MPRESSION:    1. No evidence for acute infarct.   2. Mild cerebellar volume loss.   3. Several punctate T2 and FLAIR signal hyperintensities within the cerebral white matter likely represent minimal chronic microvascular ischemic changes     Genetic testing: ATXN7 (SCA7) repeat expansion analysis- normal     Past ocular history: Cataract extraction with intraocular lens implantation 2018 each eye     FH: - Daughter is also followed for a retinal dystrophy, awaiting blue print genetic testing results.   - Cousin on her mother side has decreased vision  - No history of decreased hearing     Retinal Imaging:  OCT 08/14/23   RE: outer retina atrophic changes  LE: outer retina atrophic changes    Optos consistent with  exam  Autofluorescence: hyperautofluorescence of the macula    Assessment & Plan:    # Cone-ash dystrophy each eye   - Could be mitochondrial retinopathy, Spinocerebellar ataxia, MELAS, occult maculopahy.   - Patient will be evaluated with genetic counselor and will obtain genetic testing.    - Will obtain an ffERG    # Pseudophakia each eye   - PCIOL well centered, with visually significant PCO    Plan for yag laser right eye 08/14/23   Risks, benefits and alternatives discussed with patient and agreed to proceed  No complications     PLAN:   Yag laser right eye 08/14/23   Follow up in one week post-yag right eye and possible yag left eye  Dilate both eyes   Recommend genetic counseling 08/14/23   And to schedule ffERG         Daniel Dennis MD  PGY-5 Vitreo-retina surgery Fellow  Department of Ophthalmology   [facility]    ~~~~~~~~~~~~~~~~~~~~~~~~~~~~~~~~~~   Complete documentation of historical and exam elements from today's encounter can be found in the full encounter summary report (not reduplicated in this progress note).  I personally obtained the chief complaint(s) and history of present illness.  I confirmed and edited as necessary the review of systems, past medical/surgical history, family history, social history, and examination findings as documented by others; and I examined the patient myself.  I personally reviewed the relevant tests, images, and reports as documented above.  I personally reviewed the ophthalmic test(s) associated with this encounter, agree with the interpretation(s) as documented by the resident/fellow, and have edited the corresponding report(s) as necessary.   I formulated and edited as necessary the assessment and plan and discussed the findings and management plan with the patient and family and No resident or fellow assisted with the procedures performed.  I performed the procedures myself.    Evonne Flores MD  Professor of Ophthalmology.    Vitreo-retinal surgeon   Department of Ophthalmology and Visual Neurosciences   [facility]  Phone: [phone]   Fax: [phone]

## 2023-08-21 ENCOUNTER — OFFICE VISIT (OUTPATIENT)
Dept: OPHTHALMOLOGY | Facility: CLINIC | Age: 68
End: 2023-08-21
Attending: OPHTHALMOLOGY
Payer: COMMERCIAL

## 2023-08-21 DIAGNOSIS — H26.492 PCO (POSTERIOR CAPSULAR OPACIFICATION), LEFT: Primary | ICD-10-CM

## 2023-08-21 PROCEDURE — 66821 AFTER CATARACT LASER SURGERY: CPT | Mod: 79 | Performed by: OPHTHALMOLOGY

## 2023-08-21 PROCEDURE — 66821 AFTER CATARACT LASER SURGERY: CPT | Mod: LT | Performed by: OPHTHALMOLOGY

## 2023-08-21 PROCEDURE — 99207 PR DROP WITH A PROCEDURE: CPT | Mod: GC | Performed by: OPHTHALMOLOGY

## 2023-08-21 PROCEDURE — 250N000009 HC RX 250: Performed by: OPHTHALMOLOGY

## 2023-08-21 RX ADMIN — APRACLONIDINE HYDROCHLORIDE 1 DROP: 10 SOLUTION/ DROPS OPHTHALMIC at 11:33

## 2023-08-21 ASSESSMENT — REFRACTION_MANIFEST
OS_AXIS: 135
OD_ADD: +2.50
OD_CYLINDER: +0.50
OD_SPHERE: -1.25
OS_ADD: +2.50
OS_SPHERE: -0.50
OS_CYLINDER: +2.00
OD_AXIS: 060

## 2023-08-21 ASSESSMENT — TONOMETRY
OD_IOP_MMHG: 16
OS_IOP_MMHG: 15
IOP_METHOD: ICARE

## 2023-08-21 ASSESSMENT — EXTERNAL EXAM - LEFT EYE: OS_EXAM: NORMAL

## 2023-08-21 ASSESSMENT — CONF VISUAL FIELD
OS_SUPERIOR_NASAL_RESTRICTION: 0
OD_SUPERIOR_NASAL_RESTRICTION: 0
OD_NORMAL: 1
OS_INFERIOR_NASAL_RESTRICTION: 0
OS_NORMAL: 1
OD_SUPERIOR_TEMPORAL_RESTRICTION: 0
OD_INFERIOR_TEMPORAL_RESTRICTION: 0
OS_INFERIOR_TEMPORAL_RESTRICTION: 0
OD_INFERIOR_NASAL_RESTRICTION: 0
METHOD: COUNTING FINGERS
OS_SUPERIOR_TEMPORAL_RESTRICTION: 0

## 2023-08-21 ASSESSMENT — VISUAL ACUITY
METHOD: SNELLEN - LINEAR
OD_SC+: -1
OS_SC: 20/70
OD_SC: 20/70
METHOD_MR_RETINOSCOPY: 1
METHOD_MR: DX PURPOSES
OS_SC+: -2

## 2023-08-21 ASSESSMENT — SLIT LAMP EXAM - LIDS
COMMENTS: NORMAL
COMMENTS: NORMAL

## 2023-08-21 ASSESSMENT — EXTERNAL EXAM - RIGHT EYE: OD_EXAM: NORMAL

## 2023-08-21 ASSESSMENT — CUP TO DISC RATIO
OS_RATIO: 0.5
OD_RATIO: 0.5

## 2023-08-21 NOTE — NURSING NOTE
Chief Complaints and History of Present Illnesses   Patient presents with    Post Op (Ophthalmology) Right Eye     Chief Complaint(s) and History of Present Illness(es)       Post Op (Ophthalmology) Right Eye              Laterality: right eye    Course: gradually improving    Associated symptoms: floaters.  Negative for eye pain and flashes    Treatments tried: artificial tears              Comments    Here for post op YAG right eye and PCO evaluation left eye. Vision is better in the right eye and about the same in the left. No eye pain. Stable floaters left eye.    Demarco FORDE 10:06 AM August 21, 2023

## 2023-08-21 NOTE — PROGRESS NOTES
CC: status post yag laser right eye     Interval history: reports improvement of the vision after yag right eye; she is now interested in yag left eye      HPI: Jaylyn Berger is a 68 year old year-old patient with history of cataract extraction with IOL implantation each eye in 2018. Had a mfERG prior to the cataract surgery that showed decreased central visual sensitivity in both eyes.     Retinal Dystrophy assessment:  Nyctalopia: Yes  Problems going from outside bright light to inside: No  Problems going from inside to bright light outside: No   Photosensitivity: No  Problems with steps, curbs, or stairs: No  Problems with color vision: No  Sees flashing lights: No    PMH:   Breast Ca 2010, surgery, chemotherapy September - December 2010 (tamoxifen), radiation in 2011  Type II diabetes  Hypertension     Walking difficulties-   problems with gait and balance and possible Urinary incontinence. Occasional stiffness and pain in her bilateral lower extremities     MRI of the cervical spine 3.2023 showed Stable degenerative changes of the cervical spine compared to 01/19/2022.   Brain MRI performed in 2019 for ataxia and weakness both legs: MPRESSION:    1. No evidence for acute infarct.   2. Mild cerebellar volume loss.   3. Several punctate T2 and FLAIR signal hyperintensities within the cerebral white matter likely represent minimal chronic microvascular ischemic changes     Genetic testing: ATXN7 (SCA7) repeat expansion analysis- normal     Past ocular history: Cataract extraction with intraocular lens implantation 2018 each eye     FH: - Daughter is also followed for a retinal dystrophy, awaiting blue print genetic testing results.   - Cousin on her mother side has decreased vision  - No history of decreased hearing     Retinal Imaging:  OCT 08/14/23   RE: outer retina atrophic changes  LE: outer retina atrophic changes    Optos consistent with exam  Autofluorescence: hyperautofluorescence of the  macula    Assessment & Plan:    # Cone-ash dystrophy each eye   - Could be mitochondrial retinopathy, Spinocerebellar ataxia, MELAS, occult maculopahy.   - Patient evaluated with Yadira Flores 8/14/23, awaiting results of genetic testing  - Will obtain an ffERG and repeated mfERG    # Pseudophakia each eye   - PCIOL well centered, with visually significant PCO    - s/p YAG 08/21/23 right eye, feels that it has helped improve vision for driving  Risks, benefits and alternatives discussed with patient and agreed to proceed with YAG left eye 08/21/23     PLAN:   Follow up in one week Mrx, VTD, post-yag left eye, dilate left eye only  Schedule mfERG and ffERG and [facility] clinic in 4-6 months    Thank you for entrusting us with your care  Dorothy Handy MD, PGY3  Ophthalmology Resident  [facility]      ~~~~~~~~~~~~~~~~~~~~~~~~~~~~~~~~~~   Complete documentation of historical and exam elements from today's encounter can be found in the full encounter summary report (not reduplicated in this progress note).  I personally obtained the chief complaint(s) and history of present illness.  I confirmed and edited as necessary the review of systems, past medical/surgical history, family history, social history, and examination findings as documented by others; and I examined the patient myself.  I personally reviewed the relevant tests, images, and reports as documented above.  I formulated and edited as necessary the assessment and plan and discussed the findings and management plan with the patient and family and I was present for critical portions of the procedure carried out by the resident/fellow and immediately available for the entire procedure    Evonne Flores MD  Professor of Ophthalmology.   Vitreo-retinal surgeon   Department of Ophthalmology and Visual Neurosciences   [facility]  Phone: [phone]   Fax: [phone]

## 2023-08-23 ENCOUNTER — TELEPHONE (OUTPATIENT)
Dept: OPHTHALMOLOGY | Facility: CLINIC | Age: 68
End: 2023-08-23
Payer: COMMERCIAL

## 2023-08-23 NOTE — TELEPHONE ENCOUNTER
08-22-23  Per ERG Referral, pt saw Dr. Flores 08-21 and ffERG+mfERG requested with [facility] same day in 4-6 mos.  Pt states that Mondays or Fridays work best for transportation.  Testing and [facility] only available together on Mondays (except [facility] clinics 2nd Monday).  Sent MyChart letter and called and spoke w/pt today.  Agreed upon 2 separate Monday appts.    TT Will route to  to schedule:    Date and time = Mon Dec 4 at 8:00  Eye, Electrodiagnostic == ffERG  Attending = Mark  Referring = [facility]  Appt Notes = cone/ash dystrophy//8:00 ffERG (TT to do) and 10:30 mfERG (BD to do)  Duration = 2.5 hours  Message = chart at  for TT (2 appts)    Date and time = Mon Dec 4 at 10:30  Eye, Electrodiagnostic == mfERG  Attending = Mark  Referring = Mark  Appt Notes = reason for testing//same as above if only testing or see below if MD same day  Duration = 2.0 hours  Message = chart at  for TT (2 appts)    Date and time = Mon Dec 18 at 8:10 (or close to 8:10 if already taken)  Retina, RETURN  Attending = Mark  Referring = [facility]  Appt Notes = 4-6 mos rtn cone/ash dystrophy, ffERG+mfERG sched 12-04    NO appt packet or handouts needed.  Pt has [facility].

## 2023-08-24 ENCOUNTER — TELEPHONE (OUTPATIENT)
Dept: OPHTHALMOLOGY | Facility: CLINIC | Age: 68
End: 2023-08-24
Payer: COMMERCIAL

## 2023-08-28 ENCOUNTER — OFFICE VISIT (OUTPATIENT)
Dept: OPHTHALMOLOGY | Facility: CLINIC | Age: 68
End: 2023-08-28
Attending: OPHTHALMOLOGY
Payer: COMMERCIAL

## 2023-08-28 DIAGNOSIS — H26.492 PCO (POSTERIOR CAPSULAR OPACIFICATION), LEFT: Primary | ICD-10-CM

## 2023-08-28 PROCEDURE — G0463 HOSPITAL OUTPT CLINIC VISIT: HCPCS | Performed by: OPHTHALMOLOGY

## 2023-08-28 PROCEDURE — 99024 POSTOP FOLLOW-UP VISIT: CPT | Performed by: STUDENT IN AN ORGANIZED HEALTH CARE EDUCATION/TRAINING PROGRAM

## 2023-08-28 ASSESSMENT — REFRACTION_MANIFEST
OD_AXIS: 055
OS_SPHERE: -0.50
OD_ADD: +2.50
OD_CYLINDER: +0.50
OS_AXIS: 015
OD_SPHERE: -1.25
OS_ADD: +2.50
OS_CYLINDER: +1.25

## 2023-08-28 ASSESSMENT — VISUAL ACUITY
OS_PH_SC+: -1
OD_SC+: -1
OD_SC: 20/80
METHOD: SNELLEN - LINEAR
OS_PH_SC: 20/70
OS_SC: 20/80

## 2023-08-28 ASSESSMENT — TONOMETRY
IOP_METHOD: TONOPEN
OD_IOP_MMHG: 9
OS_IOP_MMHG: 6

## 2023-08-28 ASSESSMENT — CUP TO DISC RATIO: OS_RATIO: 0.5

## 2023-08-28 ASSESSMENT — CONF VISUAL FIELD
OS_SUPERIOR_TEMPORAL_RESTRICTION: 0
OS_SUPERIOR_NASAL_RESTRICTION: 0
OD_NORMAL: 1
METHOD: COUNTING FINGERS
OD_INFERIOR_NASAL_RESTRICTION: 0
OS_INFERIOR_TEMPORAL_RESTRICTION: 0
OD_INFERIOR_TEMPORAL_RESTRICTION: 0
OD_SUPERIOR_TEMPORAL_RESTRICTION: 0
OS_NORMAL: 1
OS_INFERIOR_NASAL_RESTRICTION: 0
OD_SUPERIOR_NASAL_RESTRICTION: 0

## 2023-08-28 ASSESSMENT — EXTERNAL EXAM - LEFT EYE: OS_EXAM: NORMAL

## 2023-08-28 ASSESSMENT — SLIT LAMP EXAM - LIDS
COMMENTS: NORMAL
COMMENTS: NORMAL

## 2023-08-28 ASSESSMENT — EXTERNAL EXAM - RIGHT EYE: OD_EXAM: NORMAL

## 2023-08-28 NOTE — PROGRESS NOTES
I have confirmed the patient's and reviewed Past Medical History, Past Surgical History, Social History, Family History, Problem List, Medication List and agree with Tech note.    CC: status post yag laser right eye     Interval history: reports improvement of the vision after yag left eye; No eye pain today, just some dryness both eyes.  No new flashes or floaters.       HPI: Jaylyn Berger is a 68 year old year-old patient with history of cataract extraction with IOL implantation each eye in 2018. Had a mfERG prior to the cataract surgery that showed decreased central visual sensitivity in both eyes.     Retinal Dystrophy assessment:  Nyctalopia: Yes  Problems going from outside bright light to inside: No  Problems going from inside to bright light outside: No   Photosensitivity: No  Problems with steps, curbs, or stairs: No  Problems with color vision: No  Sees flashing lights: No    PMH:   Breast Ca 2010, surgery, chemotherapy September - December 2010 (tamoxifen), radiation in 2011  Type II diabetes  Hypertension     Walking difficulties-   problems with gait and balance and possible Urinary incontinence. Occasional stiffness and pain in her bilateral lower extremities     MRI of the cervical spine 3.2023 showed Stable degenerative changes of the cervical spine compared to 01/19/2022.   Brain MRI performed in 2019 for ataxia and weakness both legs: MPRESSION:    1. No evidence for acute infarct.   2. Mild cerebellar volume loss.   3. Several punctate T2 and FLAIR signal hyperintensities within the cerebral white matter likely represent minimal chronic microvascular ischemic changes     Genetic testing: ATXN7 (SCA7) repeat expansion analysis- normal     Past ocular history: Cataract extraction with intraocular lens implantation 2018 each eye     FH: - Daughter is also followed for a retinal dystrophy, awaiting blue print genetic testing results.   - Cousin on her mother side has decreased vision  - No  history of decreased hearing     Retinal Imaging:  OCT 08/14/23   RE: outer retina atrophic changes  LE: outer retina atrophic changes    Optos consistent with exam  Autofluorescence: hyperautofluorescence of the macula    Assessment & Plan:    1.Pseudophakia each eye   - s/p YAG left eye 8/21/23 - Doing well  - s/p YAG 08/14/23 right eye    2. Cone-ash dystrophy each eye   - Follows with Dr. Flores who is concerned about potential mitochondrial retinopathy, Spinocerebellar ataxia, MELAS, occult maculopahy.   - Patient evaluated with Yadira Flores 8/14/23, awaiting results of genetic testing  - Continue follow-up with ffERG and repeated mfERG with Dr. Flores    3. T2DM with Mild NPDR OS  ` -Good BG/BP control discussed today    RTC: Return for Dr. Flores clinic appt and mfERG and ffERG in 4-6 months.     ATTESTATION     Attending Physician Attestation:      Complete documentation of historical and exam elements from today's encounter can be found in the full encounter summary report (not reduplicated in this progress note).  I personally obtained the chief complaint(s) and history of present illness.  I confirmed and edited as necessary the review of systems, past medical/surgical history, family history, social history, and examination findings as documented by others; and I examined the patient myself.  I personally reviewed the relevant tests, images, and reports as documented above.  I formulated and edited as necessary the assessment and plan and discussed the findings and management plan with the patient and family    Tyshawn Olmstead MD MPH  Vitreoretinal Fellow PGY-6  [facility]

## 2023-08-28 NOTE — NURSING NOTE
Chief Complaints and History of Present Illnesses   Patient presents with    Post Op (Ophthalmology) Left Eye     POW1 yag laser left eye (8/21/23)     Chief Complaint(s) and History of Present Illness(es)       Post Op (Ophthalmology) Left Eye              Associated symptoms: dryness and floaters.  Negative for eye pain, redness and flashes    Treatments tried: eye drops    Pain scale: 0/10    Comments: POW1 yag laser left eye (8/21/23)              Comments    Pt states vision has improved since last visit.   No eye pain today, just some dryness both eyes.  No new flashes or floaters.     Noe Purcell 3:24 PM August 28, 2023

## 2023-10-12 ENCOUNTER — TELEPHONE (OUTPATIENT)
Dept: CONSULT | Facility: CLINIC | Age: 68
End: 2023-10-12
Payer: COMMERCIAL

## 2023-10-24 ENCOUNTER — TELEPHONE (OUTPATIENT)
Dept: CONSULT | Facility: CLINIC | Age: 68
End: 2023-10-24
Payer: COMMERCIAL

## 2023-10-26 ENCOUNTER — TELEPHONE (OUTPATIENT)
Dept: CONSULT | Facility: CLINIC | Age: 68
End: 2023-10-26
Payer: COMMERCIAL

## 2023-10-26 NOTE — LETTER
10/26/2023    RE: Jaylyn Berger  [address] [address]     Dear Jaylyn,    Thank you for the opportunity to participate in your care at [facility]. Please let the following serve as a summary of your recent genetic testing results. A copy of those results is also enclosed.     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 This testing was analyzing over 300 genes associated with retinal dystrophies, including the mitochondrial DNA (mtDNA).     Result  Essentially negative. Sequence analysis and deletion/duplication testing did not identify any genetic changes that are known to cause disease or explain Jaylyn's symptoms.    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 The main features of Heron syndrome are changes seen in the brain on MRI imaging (known as the molar tooth sign), low muscle tone (hypotonia) and varying degrees of developmental delays and cognitive abilities. Other symptoms can include difficulty coordinating movements (ataxia), abnormal breathing in infancy, eye abnormalities (retinal dystrophy or coloboma), liver disease, kidney disease, skeletal abnormalities, or hormone (endocrine) problems. The signs and symptoms vary greatly among affected individuals with some having only a couple of these features and some having many features.     Autosomal recessive means an individual needs two likely pathogenic/pathogenic variants, one on each copy of the gene, in order to be affected with the condition. Due to the fact that  only one variant was identified in the ARL3 gene in Jaylyn, and the fact that it is currently classified as a VUS, means this variant is not contributing to Jaylyn's symptoms and she would not be considered a carrier for this condition at this time. The ARL3 uncertain variant's association with autosomal dominant retinitis pigmentosa is not a strong fit for Jaylyn's symptoms, which are described as cone-ash dystrophy while retinitis pigmentosa impacts the rods more significantly.    The TRPM1 gene is associated with autosomal recessive congenital stationary night blindness. Congenital stationary night blindness (CSNB) is a condition that affects the retina of the eye characterized by non-progressive reduced visual acuity ranging from 20/30 to 20/200, difficulty seeing at night (night blindness), myopia, and occasionally nystagmus and strabismus. Individuals with CSNB typically have normal color vision and normal fundus exam.    Jaylyn's testing did identify 2 VUS in the TRPM1 gene, but this test cannot tell us if both of these variants are on one copy of the gene (i.e. both inherited from 1 parent) or if they are on opposite copies of the genes (i.e. 1 inherited from each parent) which is required for autosomal recessive conditions.  However, Jaylyn's age of onset in particular eye findings are not consistent with CSNB.  Therefore, I do not think these variants are contributing to her vision symptoms at this time. If the lab updates the classification as they gather more information, they will send us the update and we will notify Jaylyn.     Overall, this genetic test did not identify a specific genetic cause for Jaylyn's vision symptoms.  These negative results cannot completely rule out an underlying genetic cause for her symptoms because this test was not looking at every gene and it is possible that she could have a change in one of these genes that the current technology is not capable of  detecting.    Recommendations  Consider additional/updated genetic testing in several years; this is best guided by Dr. Flores. Please do not hesitate to reach out with any questions or concerns.    Best,    Yadira Flores MS, [facility]  Genetic Counseling  [facility]  Phone: [phone]  Fax: [phone]

## 2023-10-26 NOTE — TELEPHONE ENCOUNTER
October 26, 2023    I was unable to reach Jaylyn by phone to discuss the results of her genetic testing. The following letter will be mailed to her home and viewable in her [employer] portal.    Dear Jaylyn,    Thank you for the opportunity to participate in your care at [facility]. Please let the following serve as a summary of your recent genetic testing results. A copy of those results is also enclosed.     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 This testing was analyzing over 300 genes associated with retinal dystrophies, including the mitochondrial DNA (mtDNA).     Result  Essentially negative. Sequence analysis and deletion/duplication testing did not identify any genetic changes that are known to cause disease or explain Jaylyn's symptoms.    Additional Findings  ARL3 c.23 G>A p.([id number]), heterozygous, uncertain significance  TRPM1 c.3419 A>G p.([id number]), heterozygous, uncertain significance  TRPM1 c.2887 A>C p.([id number]), heterozygous, uncertain significance    The ARL3 gene is associated with autosomal recessive Heron Syndrome and autosomal dominant retinitis pigmentosa. The main features of Heron syndrome are changes seen in the brain on MRI imaging (known as the molar tooth sign), low muscle tone (hypotonia) and varying degrees of developmental delays and cognitive abilities. Other symptoms can include difficulty coordinating movements (ataxia), abnormal breathing in infancy, eye abnormalities (retinal dystrophy or coloboma), liver disease, kidney disease, skeletal abnormalities, or hormone (endocrine) problems. The signs and symptoms vary greatly among affected individuals with some having only a couple of these features and some having many features.     Autosomal recessive means an individual needs two likely pathogenic/pathogenic  variants, one on each copy of the gene, in order to be affected with the condition. Due to the fact that only one variant was identified in the ARL3 gene in Jaylyn, and the fact that it is currently classified as a VUS, means this variant is not contributing to Jaylyn's symptoms and she would not be considered a carrier for this condition at this time. The ARL3 uncertain variant's association with autosomal dominant retinitis pigmentosa is not a strong fit for Jaylyn's symptoms, which are described as cone-ash dystrophy while retinitis pigmentosa impacts the rods more significantly.    The TRPM1 gene is associated with autosomal recessive congenital stationary night blindness. Congenital stationary night blindness (CSNB) is a condition that affects the retina of the eye characterized by non-progressive reduced visual acuity ranging from 20/30 to 20/200, difficulty seeing at night (night blindness), myopia, and occasionally nystagmus and strabismus. Individuals with CSNB typically have normal color vision and normal fundus exam.    Jaylyn's testing did identify 2 VUS in the TRPM1 gene, but this test cannot tell us if both of these variants are on one copy of the gene (i.e. both inherited from 1 parent) or if they are on opposite copies of the genes (i.e. 1 inherited from each parent) which is required for autosomal recessive conditions.  However, Jaylyn's age of onset in particular eye findings are not consistent with CSNB.  Therefore, I do not think these variants are contributing to her vision symptoms at this time. If the lab updates the classification as they gather more information, they will send us the update and we will notify Jaylyn.     Overall, this genetic test did not identify a specific genetic cause for Jaylyn's vision symptoms.  These negative results cannot completely rule out an underlying genetic cause for her symptoms because this test was not looking at every gene and it is possible that  she could have a change in one of these genes that the current technology is not capable of detecting.    Recommendations  Consider additional/updated genetic testing in several years. Jaylyn is encouraged to reach out with any additional questions or concerns.    Yadira Flores MS, [facility]  Genetic Counseling  [facility]  Pager: [phone]  Phone: [phone]  Fax: [phone]

## 2023-12-04 ENCOUNTER — ALLIED HEALTH/NURSE VISIT (OUTPATIENT)
Dept: OPHTHALMOLOGY | Facility: CLINIC | Age: 68
End: 2023-12-04
Attending: OPHTHALMOLOGY
Payer: COMMERCIAL

## 2023-12-04 DIAGNOSIS — H35.52 CONE-ROD DYSTROPHY: ICD-10-CM

## 2023-12-04 PROCEDURE — 92273 FULL FIELD ERG W/I&R: CPT

## 2023-12-04 PROCEDURE — 92274 MULTIFOCAL ERG W/I&R: CPT | Mod: 26 | Performed by: OPHTHALMOLOGY

## 2023-12-04 PROCEDURE — 92273 FULL FIELD ERG W/I&R: CPT | Mod: 26 | Performed by: OPHTHALMOLOGY

## 2023-12-04 PROCEDURE — 92274 MULTIFOCAL ERG W/I&R: CPT

## 2023-12-04 PROCEDURE — 999N000103 HC STATISTIC NO CHARGE FACILITY FEE

## 2023-12-04 ASSESSMENT — VISUAL ACUITY
OD_SC+: -1
METHOD: SNELLEN - LINEAR
OD_SC+: -1
OS_SC+: -2
OD_SC: 20/100
OS_SC: 20/70
OS_SC: 20/70
OD_SC: 20/100
METHOD: SNELLEN - LINEAR
OS_SC+: -2

## 2023-12-04 ASSESSMENT — CUP TO DISC RATIO: OS_RATIO: 0.5

## 2023-12-04 ASSESSMENT — SLIT LAMP EXAM - LIDS
COMMENTS: NORMAL
COMMENTS: NORMAL

## 2023-12-04 ASSESSMENT — EXTERNAL EXAM - LEFT EYE: OS_EXAM: NORMAL

## 2023-12-04 ASSESSMENT — EXTERNAL EXAM - RIGHT EYE: OD_EXAM: NORMAL

## 2023-12-04 NOTE — NURSING NOTE
Returns for ffERG+mfERG, prev mfERG 01-12-15.  Last eye exam w/Dr. Flores 08-28-23:  S/P CE/IOL both eyes 2018, recent yag laser 08-14 right and 08-22 left.  Cone-ash dystrophy.  Concerned about potential mitochondrial retinopathy, spinocerebellar ataxia, MELAS, occult maculopathy.  Awaiting genetic test results.  Daughter also followed for a retinal dystrophy and also awaiting genetic testing results.  H/O breast CA 2010; surgery, chemotherapy Sept-Dec 2010 (tamoxifen), radiation 2011.  Type II Diabetes.  Problems w/gait and balance.  (Using walker.) Pt states yag laser  did help some but she still needs 6x gls from [employer] for crossword puzzles.  Pt is still working as an  and uses larger font on computer screen and a handheld mag gls.  Pt also still drives w/restrictions per DMV:  daylight hours, under 44mph, no highway.  Usual routes are to grocery store and work which are both nearby.  Scheduled to return to [facility] 12-[facility]; will await results.

## 2023-12-04 NOTE — PROGRESS NOTES
STANDARD ffERG and mfERG REPORT    Testing Date:  2023    RE:  Jaylyn Berger  MRN:  [id number]  :  1955    CLINICAL HISTORY: Returns for ffERG+mfERG, prev mfERG 01-12-15.  Last eye exam with Dr. Flores 23:  S/P CE/IOL both eyes 2018, recent yag laser  right and  left. questionable Cone-ash dystrophy.  Concerned about potential mitochondrial retinopathy, spinocerebellar ataxia, MELAS, occult maculopathy.  Awaiting genetic test results.  Daughter also followed for a retinal dystrophy and also awaiting genetic testing results.  H/O breast CA ; surgery, chemotherapy Sept-Dec 2010 (tamoxifen), radiation .  Type II Diabetes.  Problems with gait and balance.  (Using walker.) Pt states yag laser  did help some but she still needs 6x gls from [employer] for crossword puzzles.  Pt is still working as an  and uses larger font on computer screen and a handheld mag gls.  Pt also still drives w/restrictions per DMV:  daylight hours, under 44mph, no highway.  Usual routes are to grocery store and work which are both nearby.  Scheduled to return to [facility] ; will await results.    IMPRESSION mfERG: Abnormal with decreased and delayed central amplitude responses in both eyes   Tech notes: mfERG (9min 60 Hz) discussed and preformed with Veris system and 0.5%CBM.  Equally well dilated 8mm. Used adult large ctl w loose fit. Taped ctl electrode upright BE. Visualized default fixation cross but increased diameter to 2 degrees BE. Nice breathing/blinking. BE had drifting and eye movement during testing. Toggled between fundus and external camera and observed fair fixation.       Mf electroretinogram  findings:  This is a reliable and well performed multifocal electroretinogram both eyes.  A 103-hexagon stimulus pattern multifocal ERG was done in both eyes.  The multifocal ERG was abnormal in both eyes using the Veris system.   In particular, in both eyes the waveform tracings  showed normal morphology and good signal-to-noise ratio.    The amplitude plot showed decreased  foveal peak.    The amplitudes themselves were attenuated in both eyes   The Implicit times were delayed in both eyes   The response densities ring ratios were abnormal both eyes.    The multifocal electroretinogram shows depression of the central waveforms both eyes                 Visual Acuity Right Eye : 20/100-1, PHNI       W/o gls, + IOL    Visual Acuity Left Eye : 20/70-2, PHNI      W/o gls, +IOL     IMPRESSION ffERG: Mild asymmetry in the ffERG responses in between both eyes with right eye greater than left eye      Mild non specific changes of unclear clinical significance of both eyes. Delayed implicit time 30 hz flicker electroretinogram                                       ALL AVERAGED                   Data for Full-Field ERG  Right Eye  Left Eye   DARK-ADAPTED Patient Normal Patient   0.01 ERG (ash) b-wave amplitude ( v) 142 69.6 to 348.2 127   0.01 ERG (ash) b-wave implicit time (ms) 86.5 78.2 to 117.2 95.4         3.0 ERG (combined) a-wave amplitude ( v) -105 -259.4 to -98 -106   3.0 ERG (combined) a-wave implicit time (ms) 16.6 11.5 to 20.3 16.6   3.0 ERG (combined) b-wave amplitude ( v) 229 159.2 to 421.6 209   3.0 ERG (combined) b-wave implicit time (ms) 52.7 41.2 to 57.2 56         10.0 ERG (brighter combined)a-wave amplitude ( v) -129 -291 to -110 -116   10.0 ERG (brighter combined)a-wave implicit time (ms) 12.8 9.9 to 15.1 14.4   10.0 ERG (brighter combined)b-wave amplitude ( v) 228 191.5 to 403.1 221   10.0 ERG (brighter combined)b-wave implicit time (ms) 48.8 39.1 to 55.3 43.8         3.0 Oscillatory Potentials  present    LIGHT-ADAPTED       3.0 Flicker (30Hz) amplitude ( v) 78 56.4 to 151.2 67   3.0 Flicker (30Hz) implicit time (ms) 32.7 21.8 to 31.6 33.8         3.0 ERG (cone) a-wave amplitude ( v) -21 -45.1 to -13.7 -12   3.0 ERG (cone) a-wave implicit time (ms) 16.1 11.4 to 16.8 12.8   3.0  ERG (cone) b-wave amplitude ( v) 96 62.4 to 174.2 87   3.0 ERG (cone) b-wave implicit time (ms) 35.5 26.8 to 34.2 34.4   * = manipulated cursors  parentheses = cursors at selected peaks  ---- = residual to non-measurable  xxxx = not tested      Tech notes:  ffERG discussed and performed with E3 system.  Instructed on importance of maintaining fixation and relaxation for optimum recordings.  Equally well-dilated ~8.5mm.  Tolerated dtl nicely.  Nice, wide eye openings.  Equal and adequate eye opening w/easy effort to clear dilated pupils.  Impedances low and comparable throughout.  No difficulty with blinking and w/tendency not to blink.  Specifically, no eyelid flutter to bright flashes and with flicker.   Extended protocol done per mention of H/O breast CA.  Diagnosys On-Off (3-step, E3) done.  No difficulty with blinking.  Monitored for lid droop as becoming fatigued.    INTERPRETATION:     The electroretinogram was performed according to ISCEV standards using ESPION E3 system and DTL fiber recording electrodes. The patient tolerated the testing well. The waveforms are fairly reproducible and well formed. The responses in between both eyes were similar.  The normative values provided above represent the 95% confidence limits for a normal individual the age of the patient. The patient s responses are averaged.  In scotopic conditions, the ash specific responses were normal in both eyes.  The maximal response, a combined ash and cone responses were essentially normal and the implicit time was normal in both eyes.   In light adapted conditions, the 30-Hz flicker response has a normal amplitude and mild delayed implicit time in both eyes. The single photopic flash response are normal, except for delayed b-wave implicit time in both eyes.  The on and off responses were present and appear normal     Conclusion: there is mild asymmetry in the ffERG responses in between both eyes with right eye greater than left eye. There  was delayed implicit time for the 30 hz flicker electroretinogram both eyes of unclear clinical significance. There is no evidence of significant ash or cone photoreceptor abnormalities in either eye. Clinical correlation is recommended.       I would recommend a repeated electroretinogram in a couple of years if there continues to be concern regarding a possible retinal dystrophy.     thank you for the opportunity to provide electrophysiologic services for this patient.  Please do not hesitate to call if there should be any questions regarding these results.    Evonne Flores MD  Professor of ophthalmology  [facility] Service   Department of Ophthalmology and Visual Neurosciences   [facility]  Phone: [phone]   Fax: [phone]

## 2023-12-04 NOTE — NURSING NOTE
Chief Complaints and History of Present Illnesses   Patient presents with    Procedure Ruy     Chief Complaint(s) and History of Present Illness(es)       Procedure Ruy               Comments    Gely Enriquez COA 1:50 PM December 4, 2023

## 2023-12-04 NOTE — LETTER
STANDARD ffERG and mfERG REPORT    Testing Date:  2023    Referring: Evonne Flores MD    RE:  Jaylyn Berger  MRN:  [id number]  :  1955    CLINICAL HISTORY:  Returns for ffERG+mfERG, prev mfERG 01-12-15.  Last eye exam with Dr. Flores 23:  S/P CE/IOL both eyes 2018, recent yag laser  right and  left. questionable Cone-ash dystrophy.  Concerned about potential mitochondrial retinopathy, spinocerebellar ataxia, MELAS, occult maculopathy.  Awaiting genetic test results.  Daughter also followed for a retinal dystrophy and also awaiting genetic testing results.  H/O breast CA ; surgery, chemotherapy Sept-Dec 2010 (tamoxifen), radiation .  Type II Diabetes.  Problems with gait and balance.  (Using walker.) Pt states yag laser  did help some but she still needs 6x gls from [employer] for crossword puzzles.  Pt is still working as an  and uses larger font on computer screen and a handheld mag gls.  Pt also still drives w/restrictions per DMV:  daylight hours, under 44mph, no highway.  Usual routes are to grocery store and work which are both nearby.  Scheduled to return to [facility] ; will await results.    IMPRESSION mfERG:  Abnormal with decreased and delayed central amplitude responses in both eyes     Tech notes: mfERG (9min 60 Hz) discussed and preformed with Veris system and 0.5%CBM.  Equally well dilated 8mm. Used adult large ctl w loose fit. Taped ctl electrode upright BE. Visualized default fixation cross but increased diameter to 2 degrees BE. Nice breathing/blinking. BE had drifting and eye movement during testing. Toggled between fundus and external camera and observed fair fixation.       Mf electroretinogram findings:  This is a reliable and well-performed multifocal electroretinogram both eyes.  A 103-hexagon stimulus pattern multifocal ERG was done in both eyes.  The multifocal ERG was abnormal in both eyes using the Veris system. In particular,  in both eyes the waveform tracings showed normal morphology and good signal-to-noise ratio.    The amplitude plot showed decreased  foveal peak.    The amplitudes themselves were attenuated in both eyes.   The Implicit times were delayed in both eyes.   The response densities ring ratios were abnormal both eyes.   The multifocal electroretinogram shows depression of the central waveforms both eyes.                 Visual Acuity without glasses, +IOL: Right Eye: 20/100-1, PHNI        Left Eye: 20/70-2, PHNI           IMPRESSION ffER. Mild asymmetry in the ffERG responses in between both eyes with right eye greater                                                          than left eye.      2. Mild non-specific changes of unclear clinical significance of both eyes. Delayed implicit                                                          time 30 hz flicker electroretinogram.                             ALL AVERAGED                   Data for Full-Field ERG  Right Eye  Left Eye   DARK-ADAPTED Patient Normal Patient   0.01 ERG (ash) b-wave amplitude ( v) 142 69.6 to 348.2 127   0.01 ERG (ash) b-wave implicit time (ms) 86.5 78.2 to 117.2 95.4         3.0 ERG (combined) a-wave amplitude ( v) -105 -259.4 to -98 -106   3.0 ERG (combined) a-wave implicit time (ms) 16.6 11.5 to 20.3 16.6   3.0 ERG (combined) b-wave amplitude ( v) 229 159.2 to 421.6 209   3.0 ERG (combined) b-wave implicit time (ms) 52.7 41.2 to 57.2 56         10.0 ERG (brighter combined)a-wave amplitude ( v) -129 -291 to -110 -116   10.0 ERG (brighter combined)a-wave implicit time (ms) 12.8 9.9 to 15.1 14.4   10.0 ERG (brighter combined)b-wave amplitude ( v) 228 191.5 to 403.1 221   10.0 ERG (brighter combined)b-wave implicit time (ms) 48.8 39.1 to 55.3 43.8         3.0 Oscillatory Potentials  Present     LIGHT-ADAPTED       3.0 Flicker (30Hz) amplitude ( v) 78 56.4 to 151.2 67   3.0 Flicker (30Hz) implicit time (ms) 32.7 21.8 to 31.6 33.8         3.0  ERG (cone) a-wave amplitude ( v) -21 -45.1 to -13.7 -12   3.0 ERG (cone) a-wave implicit time (ms) 16.1 11.4 to 16.8 12.8   3.0 ERG (cone) b-wave amplitude ( v) 96 62.4 to 174.2 87   3.0 ERG (cone) b-wave implicit time (ms) 35.5 26.8 to 34.2 34.4   * = manipulated cursors  parentheses = cursors at selected peaks  ---- = residual to non-measurable  xxxx = not tested      Tech notes:  ffERG discussed and performed with E3 system.  Instructed on importance of maintaining fixation and relaxation for optimum recordings.  Equally well-dilated ~8.5mm.  Tolerated dtl nicely.  Nice, wide eye openings.  Equal and adequate eye opening w/easy effort to clear dilated pupils.  Impedances low and comparable throughout.  No difficulty with blinking and w/tendency not to blink.  Specifically, no eyelid flutter to bright flashes and with flicker. Extended protocol done per mention of H/O breast CA.  Diagnosys On-Off (3-step, E3) done.  No difficulty with blinking.  Monitored for lid droop as becoming fatigued.    INTERPRETATION:  This electroretinogram was performed according to ISCEV standards using the ESPION E3 system and DTL fiber-recording electrodes. The patient tolerated the testing well. The waveforms are fairly reproducible and well formed. The responses in between both eyes were similar.  The normative values provided above represent the 95% confidence limits for a normal individual the age of the patient. The patient s responses are averaged.    In scotopic conditions, the ash-specific responses were normal in both eyes. The maximal response, a combined ash and cone response, was essentially normal and the implicit time was normal in both eyes.     In light-adapted conditions, the 30-Hz flicker response has a normal amplitude and mildly delayed implicit time in both eyes. The single photopic flash response are normal, except for delayed b-wave implicit time in both eyes.  The on and off responses were present and appear  normal.     CONCLUSION:  There is mild asymmetry in the ffERG responses in between both eyes with right eye greater than left eye. There was delayed implicit time for the 30 hz flicker electroretinogram both eyes of unclear clinical significance. There is no evidence of significant ash or cone photoreceptor abnormalities in either eye. Clinical correlation is recommended.       I would recommend a repeat electroretinogram in a couple of years if there continues to be concern regarding a possible retinal dystrophy.     Thank you for the opportunity to provide electrophysiologic services for this patient.      Evonne Flores MD  Professor of ophthalmology  [facility] Service   Department of Ophthalmology and Visual Neurosciences   [facility]  Phone: [phone]   Fax: [phone]

## 2023-12-08 DIAGNOSIS — H35.52 CONE-ROD DYSTROPHY: Primary | ICD-10-CM

## 2023-12-10 ENCOUNTER — HEALTH MAINTENANCE LETTER (OUTPATIENT)
Age: 68
End: 2023-12-10

## 2023-12-18 ENCOUNTER — OFFICE VISIT (OUTPATIENT)
Dept: OPHTHALMOLOGY | Facility: CLINIC | Age: 68
End: 2023-12-18
Attending: OPHTHALMOLOGY
Payer: COMMERCIAL

## 2023-12-18 DIAGNOSIS — H35.50 RETINAL DYSTROPHY: ICD-10-CM

## 2023-12-18 DIAGNOSIS — H35.50 HEREDITARY MACULAR DYSTROPHY: Primary | ICD-10-CM

## 2023-12-18 PROCEDURE — G0463 HOSPITAL OUTPT CLINIC VISIT: HCPCS | Performed by: OPHTHALMOLOGY

## 2023-12-18 PROCEDURE — 99214 OFFICE O/P EST MOD 30 MIN: CPT | Mod: GC | Performed by: OPHTHALMOLOGY

## 2023-12-18 ASSESSMENT — CUP TO DISC RATIO: OS_RATIO: 0.5

## 2023-12-18 ASSESSMENT — SLIT LAMP EXAM - LIDS
COMMENTS: NORMAL
COMMENTS: NORMAL

## 2023-12-18 ASSESSMENT — EXTERNAL EXAM - LEFT EYE: OS_EXAM: NORMAL

## 2023-12-18 ASSESSMENT — EXTERNAL EXAM - RIGHT EYE: OD_EXAM: NORMAL

## 2023-12-18 NOTE — NURSING NOTE
Chief Complaints and History of Present Illnesses   Patient presents with    Follow Up     Cone-ash dystrophy     Chief Complaint(s) and History of Present Illness(es)       Follow Up              Comments: Cone-ash dystrophy              Comments    Pt states she is only here to talk about test results  States her ride is coming in 1 hour and does not have time today for testing   Would like to talk about visits forward   States no new problems or concern    Tatianna Moreira COT 8:32 AM December 18, 2023

## 2023-12-18 NOTE — PROGRESS NOTES
CC: Cone-ash dystrophy follow up    Interval history: No changes. Patient did not want to participate in VA exam or other testing today.    HPI: Jaylyn Berger is a 68 year old year-old patient with history of cataract extraction with IOL implantation each eye in 2018. Had a mfERG prior to the cataract surgery that showed decreased central visual sensitivity in both eyes.     Retinal Dystrophy assessment:  Nyctalopia: Yes  Problems going from outside bright light to inside: No  Problems going from inside to bright light outside: No   Photosensitivity: No  Problems with steps, curbs, or stairs: No  Problems with color vision: No  Sees flashing lights: No      PMH:   Breast Ca 2010, surgery, chemotherapy September - December 2010 (tamoxifen), radiation in 2011  Type II diabetes  Hypertension     Walking difficulties-   problems with gait and balance and possible Urinary incontinence. Occasional stiffness and pain in her bilateral lower extremities     MRI of the cervical spine 3.2023 showed Stable degenerative changes of the cervical spine compared to 01/19/2022.   Brain MRI performed in 2019 for ataxia and weakness both legs: MPRESSION:    1. No evidence for acute infarct.   2. Mild cerebellar volume loss.   3. Several punctate T2 and FLAIR signal hyperintensities within the cerebral white matter likely represent minimal chronic microvascular ischemic changes     Genetic testing: ATXN7 (SCA7) repeat expansion analysis- normal     Past ocular history: Cataract extraction with intraocular lens implantation 2018 each eye     FH: - Daughter is also followed for a retinal dystrophy, awaiting blue print genetic testing results.   - Cousin on her mother side has decreased vision  - No history of decreased hearing     Retinal Imaging:  OCT 08/14/23   RE: outer retina atrophic changes  LE: outer retina atrophic changes    Optos consistent with exam  Autofluorescence: hyperautofluorescence of the macula    IMPRESSION  mfERG:Abnormal with decreased and delayed central amplitude responses in both eyes   ffERG mild non specific abnormalites    Assessment & Plan:    # Cone-ash dystrophy each eye   - Could be mitochondrial retinopathy, Spinocerebellar ataxia, MELAS, occult maculopathy.   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 4 months Optical Coherence Tomography macula, FAF optos    30 minutes of face-to-face time was spent with the patient, with her entire evaluation spent obtaining a medical history and reviewing her electronic medical record, imaging and counceling.      Alana Flores MD  Resident Physician, PGY-3  Department of Ophthalmology      ~~~~~~~~~~~~~~~~~~~~~~~~~~~~~~~~~~   Complete documentation of historical and exam elements from today's encounter can be found in the full encounter summary report (not reduplicated in this progress note).  I personally obtained the chief complaint(s) and history of present illness.  I confirmed and edited as necessary the review of systems, past medical/surgical history, family history, social history, and examination findings  as documented by others; and I examined the patient myself.  I personally reviewed the relevant tests, images, and reports as documented above.  I personally reviewed the ophthalmic test(s) associated with this encounter, agree with the interpretation(s) as documented by the resident/fellow, and have edited the corresponding report(s) as necessary.   I formulated and edited as necessary the assessment and plan and discussed the findings and management plan with the patient and family    Evonne Flores MD  Professor of Ophthalmology.   Vitreo-retinal surgeon   Department of Ophthalmology and Visual Neurosciences   [facility]  Phone: [phone]   Fax: [phone]

## 2024-02-13 ENCOUNTER — TELEPHONE (OUTPATIENT)
Dept: OPHTHALMOLOGY | Facility: CLINIC | Age: 69
End: 2024-02-13
Payer: COMMERCIAL

## 2024-02-13 NOTE — TELEPHONE ENCOUNTER
[facility] Call Center    Phone Message    May a detailed message be left on voicemail: yes     Reason for Call: Other: Per pt she is needing a vision report filled out. Pt is asking if her PCP in [place] can do this or should Dr Flores fill this out. Please contact pt to discuss her options. Thank you!      Action Taken: Message routed to:  [facility]    Travel Screening: Not Applicable

## 2024-02-13 NOTE — TELEPHONE ENCOUNTER
Left message regarding drivers form. We can fill the form out at her next visit 04/18/2024.   HARISH Li 1:32 PM February 21, 2024       Home/mobile: [phone]     Left message providing fax number of [phone] attn: Dr. Flores/virgie and should be able to assist in filling out vision report form and reach out to pt if need more information from pt.    Daniel Licea RN 2:11 PM 02/13/24

## 2024-02-22 ENCOUNTER — TELEPHONE (OUTPATIENT)
Dept: OPHTHALMOLOGY | Facility: CLINIC | Age: 69
End: 2024-02-22
Payer: COMMERCIAL

## 2024-02-22 NOTE — TELEPHONE ENCOUNTER
[facility] Call Center    Phone Message    May a detailed message be left on voicemail: yes     Reason for Call: Other: pt advised she faxed a form to Dr Flores on 2/21/24 and she wanted to make sure it was received please contact pt to verify. Thank you!      Action Taken: Message routed to:  [facility]    Travel Screening: Not Applicable

## 2024-02-26 NOTE — TELEPHONE ENCOUNTER
[facility] Call Center    Phone Message    May a detailed message be left on voicemail: yes     Reason for Call: Other: pt advises she has been waiting for a call back regarding a DMV form she faxed to Dr Flores. Pt advised she has not received a vm either. Pt is requesting a call back please. Thank you! Please see TE's in chart     Action Taken: Message routed to:  [facility]    Travel Screening: Not Applicable

## 2024-02-26 NOTE — TELEPHONE ENCOUNTER
Left message regarding drivers form. We can fill the form out at her next visit 04/18/2024.   HARISH Li 1:32 PM February 21, 2024     Provided the message above -     Jaylyn did not receive the VM we left for her .     She was upset that her DMV form will not be done sooner     She will call us if something changes     Jaylyn Velasco Communication Facilitator on 2/26/2024 at 10:19 AM

## 2024-04-03 DIAGNOSIS — H35.50 HEREDITARY MACULAR DYSTROPHY: Primary | ICD-10-CM

## 2024-04-18 ENCOUNTER — OFFICE VISIT (OUTPATIENT)
Dept: OPHTHALMOLOGY | Facility: CLINIC | Age: 69
End: 2024-04-18
Attending: OPHTHALMOLOGY
Payer: COMMERCIAL

## 2024-04-18 DIAGNOSIS — H35.50 HEREDITARY MACULAR DYSTROPHY: ICD-10-CM

## 2024-04-18 DIAGNOSIS — H35.50 RETINAL DYSTROPHY: ICD-10-CM

## 2024-04-18 PROCEDURE — 92134 CPTRZ OPH DX IMG PST SGM RTA: CPT | Mod: 26 | Performed by: OPHTHALMOLOGY

## 2024-04-18 PROCEDURE — 99214 OFFICE O/P EST MOD 30 MIN: CPT | Performed by: OPHTHALMOLOGY

## 2024-04-18 PROCEDURE — 92250 FUNDUS PHOTOGRAPHY W/I&R: CPT | Performed by: OPHTHALMOLOGY

## 2024-04-18 PROCEDURE — 92134 CPTRZ OPH DX IMG PST SGM RTA: CPT | Performed by: OPHTHALMOLOGY

## 2024-04-18 PROCEDURE — 99207 PR BUNDLED PROCEDURE IN GLOBAL PKG: CPT | Mod: 26 | Performed by: OPHTHALMOLOGY

## 2024-04-18 PROCEDURE — G0463 HOSPITAL OUTPT CLINIC VISIT: HCPCS | Performed by: OPHTHALMOLOGY

## 2024-04-18 PROCEDURE — 92081 LIMITED VISUAL FIELD XM: CPT | Performed by: OPHTHALMOLOGY

## 2024-04-18 ASSESSMENT — REFRACTION_WEARINGRX
OD_SPHERE: -0.25
OS_SPHERE: -0.25

## 2024-04-18 ASSESSMENT — CONF VISUAL FIELD
OS_INFERIOR_NASAL_RESTRICTION: 0
OD_INFERIOR_NASAL_RESTRICTION: 0
OS_SUPERIOR_NASAL_RESTRICTION: 0
OS_INFERIOR_TEMPORAL_RESTRICTION: 0
METHOD: COUNTING FINGERS
OS_NORMAL: 1
OD_SUPERIOR_NASAL_RESTRICTION: 0
OD_INFERIOR_TEMPORAL_RESTRICTION: 0
OD_NORMAL: 1
OS_SUPERIOR_TEMPORAL_RESTRICTION: 0
OD_SUPERIOR_TEMPORAL_RESTRICTION: 0

## 2024-04-18 ASSESSMENT — VISUAL ACUITY
METHOD: SNELLEN - LINEAR
OS_SC+: -1
OD_SC: 20/70
OS_SC: 20/80
OD_SC+: -2

## 2024-04-18 ASSESSMENT — SLIT LAMP EXAM - LIDS
COMMENTS: NORMAL
COMMENTS: NORMAL

## 2024-04-18 ASSESSMENT — EXTERNAL EXAM - LEFT EYE: OS_EXAM: NORMAL

## 2024-04-18 ASSESSMENT — REFRACTION_MANIFEST
OD_SPHERE: -0.25
OD_CYLINDER: SPHERE
OS_SPHERE: -0.25
OS_CYLINDER: SPHERE

## 2024-04-18 ASSESSMENT — TONOMETRY
OS_IOP_MMHG: 8
IOP_METHOD: TONOPEN
OD_IOP_MMHG: 6

## 2024-04-18 ASSESSMENT — EXTERNAL EXAM - RIGHT EYE: OD_EXAM: NORMAL

## 2024-04-18 ASSESSMENT — CUP TO DISC RATIO: OS_RATIO: 0.5

## 2024-04-18 NOTE — NURSING NOTE
Chief Complaints and History of Present Illnesses   Patient presents with    Macular Dystrophy Follow Up     Chief Complaint(s) and History of Present Illness(es)       Macular Dystrophy Follow Up              Laterality: both eyes    Associated symptoms: Negative for dryness, eye pain, flashes, floaters, itching and burning    Pain scale: 0/10              Comments    Jaylyn is here to continue care for hereditary macular dystrophy. She states no changes since last visit.    Mario Alberto Stevens COT 11:12 AM April 18, 2024

## 2024-04-18 NOTE — PROGRESS NOTES
CC: Cone-ash dystrophy follow up    Interval history: No changes. Patient did not want to participate in [facility] exam or other testing today.    HPI: Jaylyn Berger is a 68 year old year-old patient with history of cataract extraction with IOL implantation each eye in 2018. Had a mfERG prior to the cataract surgery that showed decreased central visual sensitivity in both eyes.     Retinal Dystrophy assessment:  Nyctalopia: Yes  Problems going from outside bright light to inside: No  Problems going from inside to bright light outside: No   Photosensitivity: No  Problems with steps, curbs, or stairs: No  Problems with color vision: No  Sees flashing lights: No      PMH:   Breast Ca 2010, surgery, chemotherapy September - December 2010 (tamoxifen), radiation in 2011  Type II diabetes  Hypertension     Walking difficulties-   problems with gait and balance and possible Urinary incontinence. Occasional stiffness and pain in her bilateral lower extremities     MRI of the cervical spine 3.2023 showed Stable degenerative changes of the cervical spine compared to 01/19/2022.   Brain MRI performed in 2019 for ataxia and weakness both legs: MPRESSION:    1. No evidence for acute infarct.   2. Mild cerebellar volume loss.   3. Several punctate T2 and FLAIR signal hyperintensities within the cerebral white matter likely represent minimal chronic microvascular ischemic changes     Genetic testing: ATXN7 (SCA7) repeat expansion analysis- normal     Past ocular history: Cataract extraction with intraocular lens implantation 2018 each eye     FH: - Daughter is also followed for a retinal dystrophy, awaiting blue print genetic testing results.   - Cousin on her mother side has decreased vision  - No history of decreased hearing     Retinal Imaging:  OCT 04/18/24    RE: outer retina atrophic changes- stable  LE: outer retina atrophic changes- stable    Optos consistent with exam 04/18/24   Autofluorescence: hyperautofluorescent ring  of the macula in both eyes  04/18/24     DMV OVF : right eye H 120; left eye H 140     IMPRESSION mfERG:Abnormal with decreased and delayed central amplitude responses in both eyes   ffERG mild non specific abnormalites    Assessment & Plan:    # Maculopathy both eyes  Differential diagnosis: Cone-ash dystrophy each eye; mitochondrial retinopathy, Spinocerebellar ataxia, MELAS, occult maculopathy.   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 This testing was analyzing over 300 genes associated with retinal dystrophies, including the mitochondrial DNA (mtDNA).    Result Essentially negative. Sequence analysis and deletion/duplication testing did not identify any genetic changes that are known to cause disease or explain Jaylyn's symptoms.  Additional Findings    ARL3 c.23 G>A p.([id number]), heterozygous, uncertain significance    TRPM1 c.3419 A>G p.([id number]), heterozygous, uncertain significance    TRPM1 c.2887 A>C p.([id number]), heterozygous, uncertain significance    # Pseudophakia each eye   - PCIOL well centered, with visually significant PCO    - s/p YAG 08/21/23 right eye, feels that it has helped improve vision for driving  - s/p YAG left eye 08/21/23     PLAN: Referral back to genetics for evaluation for SCA1/SCA2/SCA7 and possible whole  genome sequencing.  Follow up with retina in 8 months Optical Coherence Tomography macula, FAF optos    ~~~~~~~~~~~~~~~~~~~~~~~~~~~~~~~~~~   Complete documentation of historical and exam elements from today's encounter can be found in the full encounter summary report (not reduplicated in this progress note).  I personally obtained the chief complaint(s) and history of present illness.  I confirmed and edited as necessary the review of systems, past medical/surgical history, family history, social history, and examination findings as documented by others; and I examined the patient myself.  I personally reviewed the relevant tests, images, and reports as documented above.  I formulated and edited as necessary the assessment and plan and discussed the findings and management plan with the patient and family    Evonne Flores MD  Professor of Ophthalmology.   Vitreo-retinal surgeon   Department of Ophthalmology and Visual Neurosciences   [facility]  Phone: [phone]   Fax: [phone]

## 2024-04-22 ENCOUNTER — TELEPHONE (OUTPATIENT)
Dept: CONSULT | Facility: CLINIC | Age: 69
End: 2024-04-22
Payer: COMMERCIAL

## 2024-04-22 NOTE — TELEPHONE ENCOUNTER
I reached out to Jaylyn because we received another genetic counseling referral for her but she has been seen by two genetic counselors in the past already. I asked Jaylyn to call me back to let me know if she was interested in more genetic testing and we can get her scheduled.     Aleisha Restrepo MS, [facility]  Licensed Genetic Counselor  [facility]  Phone: [phone]  Fax: [phone]

## 2024-04-22 NOTE — TELEPHONE ENCOUNTER
Jaylyn returned my call and said she would be interested in discussing further testing and trying to get a coverage and cost estimate of more testing. I told her I would ask our  Collette to give her a call to schedule a telephone visit.     Aleisha Restrepo MS, [facility]  Licensed Genetic Counselor  [facility]  Phone: [phone]  Fax: [phone]

## 2024-04-28 ENCOUNTER — HEALTH MAINTENANCE LETTER (OUTPATIENT)
Age: 69
End: 2024-04-28

## 2024-05-30 NOTE — PROGRESS NOTES
GENETIC COUNSELING CONSULTATION NOTE    Date of visit: May 31, 2024    Presenting Information:   Jaylyn Berger is a 68 year old female referred to the [facility] due to her history of cone-ash dystrophy and ataxia symptoms. She was seen for a genetic counseling appointment at the request of her ophthalmologist Dr. Flores today.     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 She was evaluated in the Ataxia clinic on 23 with neurologist Dr. Lozada and genetic counselor Kvng Wright. They coordinated genetic testing for SCA7 which was normal. Jaylyn is also followed by Dr. Flores and saw genetic counselor Yadira Flores in the eye genetics clinic on 23. At this visit the [employer] IRD panel was ordered (which includes mitochondrial DNA sequencing) and was non-diagnostic.     Given Jaylyn's persisting symptoms of retinal dystrophy and ataxia, there is a strong suspicion for an underlying genetic cause. I was asked to meet with her today to discuss expanded ataxia testing.     Family History: A three generation pedigree was obtained and scanned into the electronic medical record at Jaylyn's first genetic counseling appointment on 23. The relevant portions are described below:    Children- Jaylyn's daughter Azul has progressive central vision loss and nyctalopia. She had a [place] Visual Field and ffERG and these were consistent with cone-ash dystrophy.  She is also reported to have ataxia and is unable to drive. Azul does not have children. Jaylyn's other daughter is well and has no children.  Parents- Jaylyn reported that her mother  in  at age 68 and was in a wheelchair at the time of her death. Jaylyn was not certain if her mother had vision loss.  Maternal relatives- Jaylyn reported that three of her mother's siblings and one maternal  cousin are wheelchair bound and some may also have vision issues. Details were not certain for these individuals. Jaylyn's maternal grandfather was wheelchair bound when he .  Paternal relatives- No vision or mobility abnormalities reported.    Family history is otherwise largely non-contributory. Consanguinity was denied.     Previous Genetic Testin23 [facility] ATXN7 (SCA7) Repeat Expansion Analysis: negative  Repeat numbers: 10 and 9 (normal- no repeat expansion detected)  2023 [employer] [facility] Tracker IRD Panel: negative  ARL3 c.23G>A (p.[id number]), variant of uncertain significance  TRPM1 c.3419A>G (p.[id number]), variant of uncertain significance  TRPM1 c.2887A>C (p.[id number]), variant of uncertain significance  Based on testing of other family member, these TRPM1 variants are in cis    Genetic Counseling Discussion:  For review, our bodies are made of cells that contain our chromosomes which are made up of long stretches of DNA containing our genes. Our genes serve as the instructions for our bodies to grow and function. We have two copies of each gene, one inherited from our mother and one inherited from our father.     Jaylyn has had two different genetic tests previously. The first test was analyzing a single gene called ATXN7. This gene causes a condition called spinocerebellar ataxia type 7 (SCA7) when there is a mutation called a repeat expansion within the gene's instruction. It is normal for someone to have 7-27 CAG repeats within this gene, but if there are more it can cause SCA7. Jaylyn's ATXN7 testing found 10 repeats on one copy and 9 repeats on the other copy of her ATXN7 genes. This is a normal result.     Jaylyn also had a multi-gene panel analyzing genes focused on her vision symptoms. This panel analyzed over 300 different genes (including the mitochondrial DNA) that are associated with various inherited retinal disorders. Overall, the results of this  testing were normal/non-diagnostic as well. Testing identified three variants of uncertain significance (VUS) but these are not thought to be contributing to Jaylyn's symptoms at this time.     Given Jaylyn's personal and family history of neurologic and retinal dystrophy, there is high suspicion that there is an underlying genetic cause for these symptoms. Therefore, we are recommending further genetic testing for her today.    Ataxia:   Ataxia is a condition characterized by poor coordination of movement, a wide-based unsteady gait, and often associated with poor coordination of limbs, eye movements, and speech. Ataxia can be caused by environmental factors (alcoholism, vitamin deficiencies, multiple sclerosis, vascular disease, tumors, and others) or genetic factors (referred to as hereditary ataxia). There are several different types of hereditary ataxia including cerebellar ataxias, episodic ataxias, spastic paraplegias, pontocerebellar hypoplasia, and ataxia neuropathy spectrum. There are over a thousand genes currently known to be associated with hereditary ataxias and these hereditary ataxias have overlapping symptoms/features so it can be challenging to determine the specific form of ataxia or predict the disease-causing gene based on clinical features alone. Therefore, genetic testing is necessary to determine the specific type of ataxia.     Some forms of ataxia are caused by sequence changes in the gene, meaning there is a single base pair change in the gene or a small deletion or duplication of base pairs, almost like a spelling error in the gene. These types of ataxia can be found by sequencing the gene and testing for these ataxias is often done via a large multi-gene panel sequencing the ataxia genes. Other forms of ataxia are repeat-mediated ataxias and are caused when small repeated segments of the DNA are expanded beyond their limits which then causes the gene to not function properly. These  repeat-mediated ataxias cannot be detected on the typical gene sequencing technology and often require a separate test to look for these repeat expansions. Some of these repeat-mediated ataxias include spinocerebellar ataxias, Friedreich ataxia, ARX-related disorders, fragile X tremor-ataxia syndrome, Dentatorubral-pallidoluysian atrophy, and others.     The hereditary ataxias can be inherited in any of the inheritance patterns depending on the gene involved. Hereditary ataxias be inherited in an autosomal recessive inheritance pattern, meaning both copies of the gene must have a change (variant) to cause the disorder.They can also be inherited in an X-linked inheritance pattern meaning a variant in a gene on the X chromosome causes the condition. Typically males are more severely affected with X-linked conditions, but carrier women can have symptoms in some X-linked disorders. Hereditary ataxias can also be inherited in an autosomal dominant inheritance pattern, meaning a variant in one copy of the gene is sufficient to cause the condition. Dominant ataxias are usually seen in multiple affected individuals in each generation of a family, but lack of family history cannot exclude dominant ataxias due to the possibly of a new variant (de dave) in the affected individual. More rarely hereditary ataxias can be inherited in a mitochondrial inheritance pattern.       We reviewed the availability of genetic testing via the [facility] Ataxia Exome for analysis of >500 genes known to be associated with various types of ataxia caused by both sequence variants and repeat expansions, with the aim of determining what condition is causing Jaylyn's  symptoms.     We went on to discuss the details, limitations, and possible outcomes of NGS. In particular, we discussed that there are three possible results from NGS:  Negative: meaning normal or no mutations are identified in the genes that were  tested/sequenced  Positive: meaning a mutation that is known to be associated with a particular set of symptoms is identified  Variant of uncertain significance (VUS): meaning a change in the DNA sequence of a particular gene was seen but there is not enough information or data yet to know if it explains the symptoms. If a VUS is identified, testing of other relatives may be helpful to provide clarification.  In most cases, identification of a VUS does not confirm a diagnosis and does not result in any clinically actionable recommendations.    We discussed the potential benefits of genetic testing and why this genetic testing is medically indicated. A positive result will help determine the etiology of the ataxia and retinal dystrophy noted in Jaylyn and may guide the medical management for her.  Also, if a genetic cause is found for Jaylyn , it will give us a more accurate risk assessment for other family members.    Next Generation Sequencing is a well established technology utilized by all molecular genetic labs throughout the country for identifying disease-causing mutations in various genes.  Next Generation Sequencing is currently the standard of care for genetic testing of single genes.  The recommended testing for Jaylyn is DIAGNOSTIC testing, and it is NOT investigational.    Jaylyn consented to genetic testing today. We will submit information for insurance prior authorization. Jaylyn will be contacted with the outcome and the estimated cost of testing. If she still wants to proceed with testing, we will arrange a blood draw for sample collection.     It was a pleasure talking with Jaylyn  today. She was encouraged to reach out to me if she has any further questions.     Plan:  We will submit insurance prior authorization for the [facility] Ataxia Exome  Our  Tasneem De La Garza will call Jaylyn in a few weeks with the outcome of the insurance prior authorization and  estimated cost of testing.  I will print a copy of my clinic note and mail this to Jaylyn Restrepo MS, [facility]  Licensed Genetic Counselor   [facility]  Phone: [phone]  Fax: [phone]    Time spent in consultation via telephone was approximately 17 minutes.

## 2024-05-31 ENCOUNTER — VIRTUAL VISIT (OUTPATIENT)
Dept: CONSULT | Facility: CLINIC | Age: 69
End: 2024-05-31
Attending: GENETIC COUNSELOR, MS
Payer: COMMERCIAL

## 2024-05-31 DIAGNOSIS — R27.0 ATAXIA: ICD-10-CM

## 2024-05-31 DIAGNOSIS — H35.50 RETINAL DYSTROPHY: Primary | ICD-10-CM

## 2024-05-31 DIAGNOSIS — Z71.83 ENCOUNTER FOR NONPROCREATIVE GENETIC COUNSELING: ICD-10-CM

## 2024-05-31 PROCEDURE — 96040 HC GENETIC COUNSELING, EACH 30 MINUTES: CPT | Mod: TEL,95 | Performed by: GENETIC COUNSELOR, MS

## 2024-05-31 NOTE — LETTER
5/31/2024      RE: Jaylyn Berger  [address] [address]     Dear Colleague,    Thank you for the opportunity to participate in the care of your patient, Jaylyn Berger, at the [facility] at [facility]. Please see a copy of my visit note below.    GENETIC COUNSELING CONSULTATION NOTE    Date of visit: May 31, 2024    Presenting Information:   Jaylyn Berger is a 68 year old female referred to the [facility] due to her history of cone-ash dystrophy and ataxia symptoms. She was seen for a genetic counseling appointment at the request of her ophthalmologist Dr. Flores today.     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 She was evaluated in the Ataxia clinic on 4/21/23 with neurologist Dr. Lozada and genetic counselor Kvng Wright. They coordinated genetic testing for SCA7 which was normal. Jaylyn is also followed by Dr. Flores and saw genetic counselor Yadira Flores in the eye genetics clinic on 8/14/23. At this visit the [employer] [facility] IRD panel was ordered (which includes mitochondrial DNA sequencing) and was non-diagnostic.     Given Jaylyn's persisting symptoms of retinal dystrophy and ataxia, there is a strong suspicion for an underlying genetic cause. I was asked to meet with her today to discuss expanded ataxia testing.     Family History: A three generation pedigree was obtained and scanned into the electronic medical record at Jaylyn's first genetic counseling appointment on 4/21/23. The relevant portions are described below:    Children- Jaylyn's daughter Azul has progressive central vision loss and nyctalopia. She had a [facility] and ffERG and these were consistent with cone-ash dystrophy.  She is also reported to have ataxia and is  unable to drive. Azul does not have children. Jaylyn's other daughter is well and has no children.  Parents- Jaylyn reported that her mother  in  at age 68 and was in a wheelchair at the time of her death. Jaylyn was not certain if her mother had vision loss.  Maternal relatives- Jaylyn reported that three of her mother's siblings and one maternal cousin are wheelchair bound and some may also have vision issues. Details were not certain for these individuals. Jaylyn's maternal grandfather was wheelchair bound when he .  Paternal relatives- No vision or mobility abnormalities reported.    Family history is otherwise largely non-contributory. Consanguinity was denied.     Previous Genetic Testin23 [facility] ATXN7 (SCA7) Repeat Expansion Analysis: negative  Repeat numbers: 10 and 9 (normal- no repeat expansion detected)  2023 [facility] IRD Panel: negative  ARL3 c.23G>A (p.[id number]), variant of uncertain significance  TRPM1 c.3419A>G (p.[id number]), variant of uncertain significance  TRPM1 c.2887A>C (p.[id number]), variant of uncertain significance  Based on testing of other family member, these TRPM1 variants are in cis    Genetic Counseling Discussion:  For review, our bodies are made of cells that contain our chromosomes which are made up of long stretches of DNA containing our genes. Our genes serve as the instructions for our bodies to grow and function. We have two copies of each gene, one inherited from our mother and one inherited from our father.     Jaylyn has had two different genetic tests previously. The first test was analyzing a single gene called ATXN7. This gene causes a condition called spinocerebellar ataxia type 7 (SCA7) when there is a mutation called a repeat expansion within the gene's instruction. It is normal for someone to have 7-27 CAG repeats within this gene, but if there are more it can cause SCA7. [facility] ATXN7  testing found 10 repeats on one copy and 9 repeats on the other copy of her ATXN7 genes. This is a normal result.     Jaylyn also had a multi-gene panel analyzing genes focused on her vision symptoms. This panel analyzed over 300 different genes (including the mitochondrial DNA) that are associated with various inherited retinal disorders. Overall, the results of this testing were normal/non-diagnostic as well. Testing identified three variants of uncertain significance (VUS) but these are not thought to be contributing to Jaylyn's symptoms at this time.     Given Jaylyn's personal and family history of neurologic and retinal dystrophy, there is high suspicion that there is an underlying genetic cause for these symptoms. Therefore, we are recommending further genetic testing for her today.    Ataxia:   Ataxia is a condition characterized by poor coordination of movement, a wide-based unsteady gait, and often associated with poor coordination of limbs, eye movements, and speech. Ataxia can be caused by environmental factors (alcoholism, vitamin deficiencies, multiple sclerosis, vascular disease, tumors, and others) or genetic factors (referred to as hereditary ataxia). There are several different types of hereditary ataxia including cerebellar ataxias, episodic ataxias, spastic paraplegias, pontocerebellar hypoplasia, and ataxia neuropathy spectrum. There are over a thousand genes currently known to be associated with hereditary ataxias and these hereditary ataxias have overlapping symptoms/features so it can be challenging to determine the specific form of ataxia or predict the disease-causing gene based on clinical features alone. Therefore, genetic testing is necessary to determine the specific type of ataxia.     Some forms of ataxia are caused by sequence changes in the gene, meaning there is a single base pair change in the gene or a small deletion or duplication of base pairs, almost like a spelling error  in the gene. These types of ataxia can be found by sequencing the gene and testing for these ataxias is often done via a large multi-gene panel sequencing the ataxia genes. Other forms of ataxia are repeat-mediated ataxias and are caused when small repeated segments of the DNA are expanded beyond their limits which then causes the gene to not function properly. These repeat-mediated ataxias cannot be detected on the typical gene sequencing technology and often require a separate test to look for these repeat expansions. Some of these repeat-mediated ataxias include spinocerebellar ataxias, Friedreich ataxia, ARX-related disorders, fragile X tremor-ataxia syndrome, Dentatorubral-pallidoluysian atrophy, and others.     The hereditary ataxias can be inherited in any of the inheritance patterns depending on the gene involved. Hereditary ataxias be inherited in an autosomal recessive inheritance pattern, meaning both copies of the gene must have a change (variant) to cause the disorder.They can also be inherited in an X-linked inheritance pattern meaning a variant in a gene on the X chromosome causes the condition. Typically males are more severely affected with X-linked conditions, but carrier women can have symptoms in some X-linked disorders. Hereditary ataxias can also be inherited in an autosomal dominant inheritance pattern, meaning a variant in one copy of the gene is sufficient to cause the condition. Dominant ataxias are usually seen in multiple affected individuals in each generation of a family, but lack of family history cannot exclude dominant ataxias due to the possibly of a new variant (de dave) in the affected individual. More rarely hereditary ataxias can be inherited in a mitochondrial inheritance pattern.       We reviewed the availability of genetic testing via the [facility] Ataxia Exome for analysis of >500 genes known to be associated with various types of ataxia caused by both sequence  variants and repeat expansions, with the aim of determining what condition is causing Jaylyn's  symptoms.     We went on to discuss the details, limitations, and possible outcomes of NGS. In particular, we discussed that there are three possible results from NGS:  Negative: meaning normal or no mutations are identified in the genes that were tested/sequenced  Positive: meaning a mutation that is known to be associated with a particular set of symptoms is identified  Variant of uncertain significance (VUS): meaning a change in the DNA sequence of a particular gene was seen but there is not enough information or data yet to know if it explains the symptoms. If a VUS is identified, testing of other relatives may be helpful to provide clarification.  In most cases, identification of a VUS does not confirm a diagnosis and does not result in any clinically actionable recommendations.    We discussed the potential benefits of genetic testing and why this genetic testing is medically indicated. A positive result will help determine the etiology of the ataxia and retinal dystrophy noted in Jaylyn and may guide the medical management for her.  Also, if a genetic cause is found for Jaylyn , it will give us a more accurate risk assessment for other family members.    Next Generation Sequencing is a well established technology utilized by all molecular genetic labs throughout the country for identifying disease-causing mutations in various genes.  Next Generation Sequencing is currently the standard of care for genetic testing of single genes.  The recommended testing for Jaylyn is DIAGNOSTIC testing, and it is NOT investigational.    Jaylyn consented to genetic testing today. We will submit information for insurance prior authorization. Jaylyn will be contacted with the outcome and the estimated cost of testing. If she still wants to proceed with testing, we will arrange a blood draw for sample collection.     It was a  pleasure talking with Jaylyn  today. She was encouraged to reach out to me if she has any further questions.     Plan:  We will submit insurance prior authorization for the [facility] Ataxia Exome  Our  Tasneem De La Garza will call Jaylyn in a few weeks with the outcome of the insurance prior authorization and estimated cost of testing.  I will print a copy of my clinic note and mail this to Jaylyn Restrepo MS, [facility]  Licensed Genetic Counselor   [facility]  Phone: [phone]  Fax: [phone]    Time spent in consultation via telephone was approximately 17 minutes.

## 2024-07-07 ENCOUNTER — HEALTH MAINTENANCE LETTER (OUTPATIENT)
Age: 69
End: 2024-07-07

## 2024-07-18 ENCOUNTER — TELEPHONE (OUTPATIENT)
Dept: CONSULT | Facility: CLINIC | Age: 69
End: 2024-07-18
Payer: COMMERCIAL

## 2024-07-18 NOTE — TELEPHONE ENCOUNTER
No prior authorization is required for an Ataxia Exome.  [employer] defers to Medicare for coverage of this test.    I communicated with [facility] about options and patient would have to sign an ABN saying they'll pay $4,633 for the test.    I spoke with Jaylyn and she is declining testing at this time.  I encouraged her to contact us if questions do come up in the future or if something changes, we are available.     Tasneem De La Garza MA  - Genetics  Phone: [phone]  Fax: [phone]  [email or web address]

## 2024-09-15 ENCOUNTER — HEALTH MAINTENANCE LETTER (OUTPATIENT)
Age: 69
End: 2024-09-15

## 2024-11-24 ENCOUNTER — HEALTH MAINTENANCE LETTER (OUTPATIENT)
Age: 69
End: 2024-11-24

## 2025-01-11 ENCOUNTER — HEALTH MAINTENANCE LETTER (OUTPATIENT)
Age: 70
End: 2025-01-11

## 2025-04-10 DIAGNOSIS — H35.50 RETINAL DYSTROPHY: Primary | ICD-10-CM

## 2025-04-24 ENCOUNTER — OFFICE VISIT (OUTPATIENT)
Dept: OPHTHALMOLOGY | Facility: CLINIC | Age: 70
End: 2025-04-24
Attending: OPHTHALMOLOGY
Payer: COMMERCIAL

## 2025-04-24 DIAGNOSIS — H35.50 RETINAL DYSTROPHY: ICD-10-CM

## 2025-04-24 PROCEDURE — G0463 HOSPITAL OUTPT CLINIC VISIT: HCPCS | Performed by: OPHTHALMOLOGY

## 2025-04-24 PROCEDURE — 92250 FUNDUS PHOTOGRAPHY W/I&R: CPT | Performed by: OPHTHALMOLOGY

## 2025-04-24 PROCEDURE — 92134 CPTRZ OPH DX IMG PST SGM RTA: CPT | Performed by: OPHTHALMOLOGY

## 2025-04-24 ASSESSMENT — CUP TO DISC RATIO: OS_RATIO: 0.5

## 2025-04-24 ASSESSMENT — CONF VISUAL FIELD
OS_SUPERIOR_TEMPORAL_RESTRICTION: 0
OD_SUPERIOR_TEMPORAL_RESTRICTION: 0
METHOD: COUNTING FINGERS
OD_SUPERIOR_NASAL_RESTRICTION: 0
OS_NORMAL: 1
OD_INFERIOR_NASAL_RESTRICTION: 0
OD_INFERIOR_TEMPORAL_RESTRICTION: 0
OS_SUPERIOR_NASAL_RESTRICTION: 0
OS_INFERIOR_NASAL_RESTRICTION: 0
OD_NORMAL: 1
OS_INFERIOR_TEMPORAL_RESTRICTION: 0

## 2025-04-24 ASSESSMENT — VISUAL ACUITY
OD_SC+: -2
OS_SC: 20/100
OS_SC+: +1
METHOD: SNELLEN - LINEAR
OD_SC: 20/70

## 2025-04-24 ASSESSMENT — SLIT LAMP EXAM - LIDS
COMMENTS: NORMAL
COMMENTS: NORMAL

## 2025-04-24 ASSESSMENT — TONOMETRY
OS_IOP_MMHG: 14
OD_IOP_MMHG: 15
IOP_METHOD: TONOPEN

## 2025-04-24 ASSESSMENT — EXTERNAL EXAM - LEFT EYE: OS_EXAM: NORMAL

## 2025-04-24 ASSESSMENT — EXTERNAL EXAM - RIGHT EYE: OD_EXAM: NORMAL

## 2025-04-24 NOTE — NURSING NOTE
Chief Complaints and History of Present Illnesses   Patient presents with    Retinal Dystrophy Hereditary Follow Up     Chief Complaint(s) and History of Present Illness(es)       Retinal Dystrophy Hereditary Follow Up               Comments    Patient states that her vision is the same since she was here last. She states that she still notices the cloud in her vision. She states that she does drive close to home. No flashes of light. No floaters.     Ocular Meds:artifical tears as needed    Marco MOREIRA, April 24, 2025 9:44 AM

## 2025-04-24 NOTE — PROGRESS NOTES
CC: Cone-ash dystrophy follow up    Interval history: No changes. No new flashes and floaters   HPI: Jaylyn Berger is a 69 year old year-old patient with history of cataract extraction with IOL implantation each eye in 2018. Had a mfERG prior to the cataract surgery that showed decreased central visual sensitivity in both eyes.     Retinal Dystrophy assessment:  Nyctalopia: Yes  Problems going from outside bright light to inside: No  Problems going from inside to bright light outside: No   Photosensitivity: No  Problems with steps, curbs, or stairs: No  Problems with color vision: No  Sees flashing lights: No    PMH:  Breast Ca 2010, surgery, chemotherapy September - December 2010 (tamoxifen), radiation in 2011  Recurrent in October 2024- mastectomy  Type II diabetes  Hypertension     Walking difficulties-   problems with gait and balance and possible Urinary incontinence. Occasional stiffness and pain in her bilateral lower extremities     MRI of the cervical spine 3.2023 showed Stable degenerative changes of the cervical spine compared to 01/19/2022.   Brain MRI performed in 2019 for ataxia and weakness both legs:   No evidence for acute infarct.  Mild cerebellar volume loss.   Several punctate T2 and FLAIR signal hyperintensities within the cerebral white matter likely represent minimal chronic microvascular ischemic changes     Past ocular history: Cataract extraction with intraocular lens implantation 2018 each eye     FH: - Daughter MRN: [id number]  is also followed for a retinal dystrophy, awaiting blue print genetic testing results. Michelle Berger  - Cousin on her mother side has decreased vision  - No history of decreased hearing     Retinal Imaging:  Optical Coherence Tomography 04/24/25   RE: outer retina atrophic changes; mild Epiretinal membrane - stable  LE: outer retina atrophic changes; mild Epiretinal membrane - stable    Optos consistent with exam 04/24/25   Autofluorescence 04/24/25 :  hyperautofluorescent ring of the macula in both eyes      DMV OVF : right eye H 120; left eye H 140     IMPRESSION mfERG:Abnormal with decreased and delayed central amplitude responses in both eyes   ffERG mild non specific abnormalites    Assessment & Plan:    # Maculopathy both eyes  Differential diagnosis:  toxic maculopathy; Cone-ash dystrophy each eye; mitochondrial retinopathy, Spinocerebellar ataxia, MELAS, occult maculopathy.   - Hx of breast cancer 2010 with tamoxifen use at that time, radiation 2011  - recurrence of breast Ca 2024 status post mastectomy  - Patient evaluated with Yadira Flores 8/14/23 without any findings explaining patient's phenotype  - Daughter (MRN: [id number] ) also being worked up for cone ash dystrophy - shared 2 VUS on genetic testing (TRPM1)  daughter have difficulty with walking and concern for spinocerebellar ataxia  - mfERG 12/4/23 with decreased and delayed central amplitude responses in both eyes     -Genetic testing: [facility] Panel Plus sponsored testing through [employer]. This testing was analyzing over 300 genes associated with retinal dystrophies, including the mitochondrial DNA (mtDNA).    Result Essentially negative. Sequence analysis and deletion/duplication testing did not identify any genetic changes that are known to cause disease or explain Jaylyn's symptoms.  Additional Findings  ARL3 c.23 G>A p.([id number]), heterozygous, uncertain significance  TRPM1 c.3419 A>G p.([id number]), heterozygous, uncertain significance  TRPM1 c.2887 A>C p.([id number]), heterozygous, uncertain significance    Genetic testing: ATXN7 (SCA7) repeat expansion analysis- normal   Previously consider Referral back to genetics for evaluation for SCA1/SCA2/SCA7 and possible whole genome sequencing- however per patient it was too expensive and not done yet    # Pseudophakia each eye   - PCIOL well centered, with visually significant PCO    - s/p YAG 08/21/23 right eye,  feels that it has helped improve vision for driving  - s/p YAG left eye 08/21/23     PLAN: stable exam  Observe Follow up with retina in 12 months Optical Coherence Tomography macula, FAF optos  Consider low vision referral    ~~~~~~~~~~~~~~~~~~~~~~~~~~~~~~~~~~   Complete documentation of historical and exam elements from today's encounter can be found in the full encounter summary report (not reduplicated in this progress note).  I personally obtained the chief complaint(s) and history of present illness.  I confirmed and edited as necessary the review of systems, past medical/surgical history, family history, social history, and examination findings as documented by others; and I examined the patient myself.  I personally reviewed the relevant tests, images, and reports as documented above.  I formulated and edited as necessary the assessment and plan and discussed the findings and management plan with the patient and family    Evonne Flores MD  Professor of Ophthalmology.   Vitreo-retinal surgeon   Department of Ophthalmology and Visual Neurosciences   [facility]  Phone: [phone]   Fax: [phone]

## 2025-04-26 ENCOUNTER — HEALTH MAINTENANCE LETTER (OUTPATIENT)
Age: 70
End: 2025-04-26

## 2025-05-07 ENCOUNTER — TELEPHONE (OUTPATIENT)
Dept: OPHTHALMOLOGY | Facility: CLINIC | Age: 70
End: 2025-05-07
Payer: COMMERCIAL

## 2025-05-07 NOTE — TELEPHONE ENCOUNTER
tech appointment for DMV field and for the form to be completed and signed in front of our staff.     Called and LVM      can you try one more time tomorrow - thanks     Jaylyn Velasco Communication Facilitator on 5/7/2025 at 2:17 PM

## 2025-05-07 NOTE — TELEPHONE ENCOUNTER
[facility] Call Center    Phone Message    May a detailed message be left on voicemail: yes     Reason for Call: Other: Per pt she is having forms faxed over that need to be filled out. Please let he know when they have been received. Thank you     Action Taken: Message routed to:  [facility] E[facility]    Travel Screening: Not Applicable     Date of Service:

## 2025-05-08 ENCOUNTER — TELEPHONE (OUTPATIENT)
Dept: OPHTHALMOLOGY | Facility: CLINIC | Age: 70
End: 2025-05-08
Payer: COMMERCIAL

## 2025-05-08 NOTE — TELEPHONE ENCOUNTER
[facility] Call Center    Phone Message    May a detailed message be left on voicemail: yes     Reason for Call: Other: Patient returned call from the clinic. Patient is available before 9:45am and then after 12 noon. Thank you.      Action Taken: Other: [facility]    Travel Screening: Not Applicable     Date of Service:

## 2025-06-10 NOTE — PATIENT INSTRUCTIONS
Spoke to pt daughter and scheduled pt for apt for 6/20/25.    We will take some blood today to look for genetic ataxia. This is a difficulty with coordination that can run in your family.     We will contact you after the results are in.     Please let us know if you have any questions in the meantime. Our clinic number is [phone].     Keep staying active!

## 2025-07-19 ENCOUNTER — HEALTH MAINTENANCE LETTER (OUTPATIENT)
Age: 70
End: 2025-07-19

## 2025-08-09 ENCOUNTER — HEALTH MAINTENANCE LETTER (OUTPATIENT)
Age: 70
End: 2025-08-09